# Patient Record
Sex: MALE | Race: BLACK OR AFRICAN AMERICAN | Employment: FULL TIME | URBAN - METROPOLITAN AREA
[De-identification: names, ages, dates, MRNs, and addresses within clinical notes are randomized per-mention and may not be internally consistent; named-entity substitution may affect disease eponyms.]

---

## 2022-04-12 DIAGNOSIS — M79.641 HAND PAIN, RIGHT: Primary | ICD-10-CM

## 2022-04-13 ENCOUNTER — OFFICE VISIT (OUTPATIENT)
Dept: ORTHOPEDIC SURGERY | Age: 57
End: 2022-04-13
Payer: MEDICARE

## 2022-04-13 VITALS — HEIGHT: 73 IN | WEIGHT: 175 LBS | BODY MASS INDEX: 23.19 KG/M2

## 2022-04-13 DIAGNOSIS — S62.336A CLOSED DISPLACED FRACTURE OF NECK OF FIFTH METACARPAL BONE OF RIGHT HAND, INITIAL ENCOUNTER: Primary | ICD-10-CM

## 2022-04-13 PROCEDURE — G8427 DOCREV CUR MEDS BY ELIG CLIN: HCPCS | Performed by: PHYSICIAN ASSISTANT

## 2022-04-13 PROCEDURE — G8420 CALC BMI NORM PARAMETERS: HCPCS | Performed by: PHYSICIAN ASSISTANT

## 2022-04-13 PROCEDURE — 4004F PT TOBACCO SCREEN RCVD TLK: CPT | Performed by: PHYSICIAN ASSISTANT

## 2022-04-13 PROCEDURE — 99204 OFFICE O/P NEW MOD 45 MIN: CPT | Performed by: PHYSICIAN ASSISTANT

## 2022-04-13 PROCEDURE — 29125 APPL SHORT ARM SPLINT STATIC: CPT | Performed by: PHYSICIAN ASSISTANT

## 2022-04-13 PROCEDURE — 3017F COLORECTAL CA SCREEN DOC REV: CPT | Performed by: PHYSICIAN ASSISTANT

## 2022-04-13 ASSESSMENT — ENCOUNTER SYMPTOMS
SHORTNESS OF BREATH: 0
COUGH: 0
COLOR CHANGE: 0
VOMITING: 0

## 2022-04-13 NOTE — PROGRESS NOTES
[address]  [address] [facility] [address]  Dept: [phone]    Ambulatory Orthopedic New Patient Visit      CHIEF COMPLAINT:    Chief Complaint   Patient presents with    New Patient     Hand got caught putting a surpintine belt on. around 4/6/2022       HISTORY OF PRESENT ILLNESS:      Date of Injury: 4/6/2022    The patient is a 61 y.o. male who is being seen  for consultation and evaluation of right hand pain that has been present for approximately 1 week. Patient is a current inmate at [id number], he notes that he may be released today. He notes that he has a court date today at 1 PM.     Patient notes that his injury was sustained approximately 1 week ago on 4/6/2022. He notes that he was working on a car and got his hand stuck in a serpentine belt. He notes that he has not had medical evaluation or treatment for his hand since the injury. He notes that he has been taking ibuprofen for his discomfort. He denies numbness and or tingling in the right hand. He denies prior history of injury, trauma, surgery to the right hand.       Past Medical History:    Past Medical History:   Diagnosis Date    H. pylori infection     KENJI (obstructive sleep apnea) 6/14/2012       Past Surgical History:    Past Surgical History:   Procedure Laterality Date    UPPER GASTROINTESTINAL ENDOSCOPY  08/23/2016       Current Medications:   Current Outpatient Medications   Medication Sig Dispense Refill    dicyclomine (BENTYL) 10 MG capsule Take 1 capsule by mouth every 6 hours as needed (cramps) 30 capsule 0    famotidine (PEPCID) 40 MG tablet Take 1 tablet by mouth daily 30 tablet 1    omeprazole (PRILOSEC) 20 MG capsule Take 1 capsule by mouth 2 times daily 60 capsule 1    ondansetron (ZOFRAN) 4 MG tablet Take 1 tablet by mouth every 8 hours as needed for Nausea or Vomiting 30 tablet 1     No current facility-administered medications for this visit. Allergies:    Patient has no known allergies. Social History:   Social History     Socioeconomic History    Marital status: Single     Spouse name: Not on file    Number of children: Not on file    Years of education: Not on file    Highest education level: Not on file   Occupational History    Not on file   Tobacco Use    Smoking status: Current Every Day Smoker     Packs/day: 0.50     Years: 20.00     Pack years: 10.00     Types: Cigarettes    Smokeless tobacco: Never Used   Substance and Sexual Activity    Alcohol use: No    Drug use: No     Types: Cocaine     Comment: no drug use x 20 years    Sexual activity: Yes   Other Topics Concern    Not on file   Social History Narrative    Not on file     Social Determinants of Health     Financial Resource Strain:     Difficulty of Paying Living Expenses: Not on file   Food Insecurity:     Worried About Running Out of Food in the Last Year: Not on file    Charanjit of Food in the Last Year: Not on file   Transportation Needs:     Lack of Transportation (Medical): Not on file    Lack of Transportation (Non-Medical):  Not on file   Physical Activity:     Days of Exercise per Week: Not on file    Minutes of Exercise per Session: Not on file   Stress:     Feeling of Stress : Not on file   Social Connections:     Frequency of Communication with Friends and Family: Not on file    Frequency of Social Gatherings with Friends and Family: Not on file    Attends Anglican Services: Not on file    Active Member of Clubs or Organizations: Not on file    Attends Club or Organization Meetings: Not on file    Marital Status: Not on file   Intimate Partner Violence:     Fear of Current or Ex-Partner: Not on file    Emotionally Abused: Not on file    Physically Abused: Not on file    Sexually Abused: Not on file   Housing Stability:     Unable to Pay for Housing in the Last Year: Not on file    Number of [facility] in the Last Year: Not on file    Unstable Housing in the Last Year: Not on file       Family History:  Family History   Problem Relation Age of Onset    Cancer Mother     High Blood Pressure Mother     Stroke Sister          REVIEW OF SYSTEMS:  Review of Systems   Constitutional: Negative for activity change and fever. HENT: Negative for sneezing. Respiratory: Negative for cough and shortness of breath. Cardiovascular: Negative for chest pain. Gastrointestinal: Negative for vomiting. Musculoskeletal: Positive for arthralgias (right hand). Negative for joint swelling and myalgias. Skin: Negative for color change. Neurological: Negative for weakness and numbness. Psychiatric/Behavioral: Negative for sleep disturbance. PHYSICAL EXAM:  Ht 6' 1\" (1.854 m)   Wt 175 lb (79.4 kg)   BMI 23.09 kg/m²  Body mass index is 23.09 kg/m². Physical Exam  Gen: alert and oriented  Psych:  Appropriate affect; Appropriate knowledge base; Appropriate mood; No hallucinations; Head: normocephalic, atraumatic   Chest: symmetric chest excursion  Pelvis: stable with ambulation  Ortho Exam  Extremity: Evaluation of the right hand reveals mild diffuse swelling noted on the dorsum of the right hand. There is no erythema, ecchymosis, skin lesions or signs of infection noted. Significant tenderness noted with palpation over the dorsum of the fourth and fifth metacarpal heads. No tenderness noted over the palmar surface of the fourth or fifth metacarpal head. Patient is able to make a loose composite fist.  Normal cascade appreciated. Patient has full range of motion of the right wrist and elbow without discomfort noted. Sensation is intact to light touch to the right upper extremity without focal deficits present. The skin is noted to be warm with brisk capillary refill distally on the right hand. Radial pulse 2+ on the right.     Radiology:     XR HAND RIGHT (MIN 3 VIEWS)    Result Date: 4/13/2022  History: Right hand pain. Findings: AP, lateral, oblique images of the right hand obtained today in office reveal a mildly displaced right fifth metacarpal neck fracture with mild volar angulation. Joint spaces are well-maintained. No further evidence of fracture, subluxation, dislocation, radiopaque foreign body or radiopaque tumor noted within the right hand. Impression: Right hand fifth metacarpal neck fracture as described above. Procedure:  After informed consent was obtained verbally, a well molded well-padded TKO fast form cast was applied with the Right wrist in neutral position in the fourth and fifth fingers in an intrinsic plus position. No impingement of the cast was noted proximally or distally after application. The fingers were noted to be pink, warm, with brisk capillary refill after application of the cast.  The patient tolerated the cast application well without complications. ASSESSMENT:     1. Closed displaced fracture of neck of fifth metacarpal bone of right hand, initial encounter         PLAN:        Today in office we discussed etiology and natural history of right hand 5th metacarpal neck fracture. I personally reviewed the patient's x-rays from today revealing mildly displaced a right 5th metacarpal head fracture. The fracture has adequate alignment and at this point does not need surgical intervention. The patient was placed in a fast form TKO cast and was instructed to keep it clean and dry. He is to continue taking tylenol and ibuprofen foe pain control. He will follow up in 2 weeks with x-rays in the fast form cast.     He was instructed to call our office with questions or concerns. He voiced his understanding. Return in about 2 weeks (around 4/27/2022) for re-evaluation. Total Time: 45 min      No orders of the defined types were placed in this encounter.       Orders Placed This Encounter   Procedures    ND APPLY FOREARM SPLINT,STATIC       This note is created with the assistance of a speech recognition program.  While intending to generate a document that actually reflects the content of the visit, the document can still have some errors including those of syntax and sound a like substitutions which may escape proof reading. In such instances, actual meaning can be extrapolated by contextual diversion.      Electronically signed by Luz Maria Lozano PA-C 4/13/2022 at 8:41 PM

## 2022-04-13 NOTE — LETTER
Amalia Oropeza, [address]  [address] [facility] [address]  Dept: [phone]  Dept Fax: [phone]  4/13/22    Patient: Porter Ascencio  YOB: 1965    Dear None Provider,    I had the pleasure of seeing one of your patients, Kate Banegas today in the office. Below are the relevant portions of my assessment and plan of care. IMPRESSION:  1. Closed displaced fracture of neck of fifth metacarpal bone of right hand, initial encounter      PLAN:    1. The patient was placed in a right hand fast form cast. The patient was instructed to to keep in clean and dry. It is not able to be removed. Cover it with plastic wrap or a bag for bathing. 2. Tylenol 1000mg every 6 hours or Ibuprofen 800mg every 8 hours for pain. 3. Patient is to follow up in 2 weeks with x-rays in splint. Thank you for allowing me to participate in the care of this patient. I will keep you updated on this patient's follow up and I look forward to serving you and your patients again in the future.           Amalia Skinner PA-C

## 2022-04-26 ENCOUNTER — TELEPHONE (OUTPATIENT)
Dept: ORTHOPEDIC SURGERY | Age: 57
End: 2022-04-26

## 2022-04-26 NOTE — TELEPHONE ENCOUNTER
[address] [place] [address] called to cancel patient's appointment because the patient has been released from the facility. I tried reaching out to he patient for a reminder but could not reach him. I left appointment in case patient comes in please advise.

## 2022-04-27 ENCOUNTER — TELEPHONE (OUTPATIENT)
Dept: ORTHOPEDIC SURGERY | Age: 57
End: 2022-04-27

## 2022-04-27 DIAGNOSIS — S62.336A CLOSED DISPLACED FRACTURE OF NECK OF FIFTH METACARPAL BONE OF RIGHT HAND, INITIAL ENCOUNTER: Primary | ICD-10-CM

## 2022-04-29 ENCOUNTER — OFFICE VISIT (OUTPATIENT)
Dept: ORTHOPEDIC SURGERY | Age: 57
End: 2022-04-29
Payer: MEDICARE

## 2022-04-29 VITALS — WEIGHT: 175 LBS | HEIGHT: 73 IN | BODY MASS INDEX: 23.19 KG/M2

## 2022-04-29 DIAGNOSIS — S62.336D CLOSED DISPLACED FRACTURE OF NECK OF FIFTH METACARPAL BONE OF RIGHT HAND WITH ROUTINE HEALING, SUBSEQUENT ENCOUNTER: Primary | ICD-10-CM

## 2022-04-29 PROCEDURE — 3017F COLORECTAL CA SCREEN DOC REV: CPT | Performed by: PHYSICIAN ASSISTANT

## 2022-04-29 PROCEDURE — 4004F PT TOBACCO SCREEN RCVD TLK: CPT | Performed by: PHYSICIAN ASSISTANT

## 2022-04-29 PROCEDURE — G8427 DOCREV CUR MEDS BY ELIG CLIN: HCPCS | Performed by: PHYSICIAN ASSISTANT

## 2022-04-29 PROCEDURE — G8420 CALC BMI NORM PARAMETERS: HCPCS | Performed by: PHYSICIAN ASSISTANT

## 2022-04-29 PROCEDURE — 99214 OFFICE O/P EST MOD 30 MIN: CPT | Performed by: PHYSICIAN ASSISTANT

## 2022-04-29 ASSESSMENT — ENCOUNTER SYMPTOMS
SHORTNESS OF BREATH: 0
VOMITING: 0
COUGH: 0
COLOR CHANGE: 0

## 2022-04-29 NOTE — PROGRESS NOTES
[address]  [address]  Dept: [phone]  Dept Fax: [phone]        Ambulatory Follow Up      Subjective: Loan Cortez is a 61y.o. year old male who presents to our office today for routine followup regarding his   1. Closed displaced fracture of neck of fifth metacarpal bone of right hand with routine healing, subsequent encounter        Chief Complaint   Patient presents with    Follow-up     5th metacarpal fx  DOI 04/6/2022       Date of Injury: 4/6/2022    HPI Loan Cortez  is a 61 y.o. Right hand dominant  male who presents today in follow for right fifth metacarpal neck fracture sustained on 4/6/2022 after getting his right hand stuck in a serpentine belt of a motor vehicle. The patient was last seen on 4/13/2022 and underwent treatment in the form of application of a fast form TKO splint. At the time the patient was incarcerated at [address] [place] [phone] center but has been released since then. The patient notes some continued discomfort within the right hand. He notes he has been able to maintain the fast form splint on the right upper extremity without difficulty. Review of Systems   Constitutional: Negative for activity change and fever. HENT: Negative for sneezing. Respiratory: Negative for cough and shortness of breath. Cardiovascular: Negative for chest pain. Gastrointestinal: Negative for vomiting. Musculoskeletal: Positive for arthralgias (right hand). Negative for joint swelling and myalgias. Skin: Negative for color change. Neurological: Negative for weakness and numbness. Psychiatric/Behavioral: Negative for sleep disturbance. Objective :   Ht 6' 1\" (1.854 m)   Wt 175 lb (79.4 kg)   BMI 23.09 kg/m²  Body mass index is 23.09 kg/m². General: Loan Cortez is a 61 y.o. male who is alert and oriented and sitting comfortably in our office.   Ortho Exam  MS: Patient arrived with the fast form splint on the right upper extremity. There is moderate signs of wear noted to the splint. Area of exposed skin on the proximal aspects of the fourth and fifth finger on the right hand and the remainder of the right hand shows no significant swelling. The splint continues to fit appropriately. Patient has full range of motion of the right elbow without discomfort noted. Sensation is intact to the exposed areas of skin without abscess present. The skin is noted to be warm with brisk capillary refill distally. Radial pulse 2+ on the right. Neuro: alert and oriented to person and place. Eyes: Extra-ocular muscles intact  Mouth: Oral mucosa moist. No perioral lesions  Pulm: Respirations unlabored and regular. Symmetric chest excursion without outward deformity is noted. Skin: warm, well perfused  Psych:   Patient has good fund of knowledge and displays understanging of exam, diagnosis, and plan. Radiology:   XR HAND RIGHT (MIN 3 VIEWS)    Result Date: 4/29/2022  History: Right hand pain. Comparison: 4/13/2022. Findings: AP, lateral, oblique images of the right hand obtained today in office reveal a mildly displaced right fifth metacarpal neck fracture with mild dorsal angulation appreciated. Joint spaces are well-maintained. Interval healing appreciated with mild bony callus formation noted on the volar surface of the fracture. There is no further evidence of fracture, subluxation, dislocation, radiopaque foreign body or radiopaque tumor noted within the right hand. No change in alignment when compared to images from 4/13/2022. Impression: Right hand fifth metacarpal neck fracture healing as described above. XR HAND RIGHT (MIN 3 VIEWS)    Result Date: 4/20/2022  History: Right hand pain.  Findings: AP, lateral, oblique images of the right hand obtained today in office reveal a mildly displaced right fifth metacarpal neck fracture with mild volar angulation. Joint spaces are well-maintained. No further evidence of fracture, subluxation, dislocation, radiopaque foreign body or radiopaque tumor noted within the right hand. Impression: Right hand fifth metacarpal neck fracture as described above. Assessment:      1. Closed displaced fracture of neck of fifth metacarpal bone of right hand with routine healing, subsequent encounter       Plan:      Patient is currently 3 weeks post injury after sustaining a right fifth metacarpal neck fracture on 4/6/2022. The patient has been in a right upper extremity fast form TKO brace since 4/13/2022. The patient has maintained the brace. I personally reviewed his right hand x-rays from today and compared them to prior images revealing a mildly displaced right fifth metacarpal neck fracture with notable volar angulation appreciated. The patient's initial fist cascade was normal therefore we are going to have him continue in the fast form splint on the right upper extremity. He was instructed to keep it clean and dry and not to remove it until his next appointment. The patient will follow up in 3 weeks with x-rays of the right hand out of fast form cast.  He was instructed to call us with any questions or concerns prior to his next appointment. He noted his understanding. Follow up:Return in about 3 weeks (around 5/20/2022) for re-evaluation with x-rays of right hand out of fast form cast.    Total Time: 35 min      No orders of the defined types were placed in this encounter. Orders Placed This Encounter   Procedures    XR HAND RIGHT (MIN 3 VIEWS)     Standing Status:   Future     Number of Occurrences:   1     Standing Expiration Date:   4/29/2023     Scheduling Instructions:       In brace       This note is created with the assistance of a speech recognition program.  While intending to generate a document that actually reflects the content of the visit, the document can still have some errors including those of syntax and sound a like substitutions which may escape proof reading. In such instances, actual meaning can be extrapolated by contextual diversion.      Electronically signed by Faustina Baugh PA-C on 4/29/2022 at 3:23 PM

## 2022-05-19 DIAGNOSIS — R52 PAIN: Primary | ICD-10-CM

## 2022-12-08 ENCOUNTER — HOSPITAL ENCOUNTER (EMERGENCY)
Age: 57
Discharge: HOME | End: 2022-12-08
Attending: EMERGENCY MEDICINE
Payer: MEDICARE

## 2022-12-08 VITALS
HEIGHT: 73 IN | OXYGEN SATURATION: 99 % | WEIGHT: 165 LBS | HEART RATE: 63 BPM | TEMPERATURE: 97.2 F | DIASTOLIC BLOOD PRESSURE: 76 MMHG | BODY MASS INDEX: 21.87 KG/M2 | RESPIRATION RATE: 18 BRPM | SYSTOLIC BLOOD PRESSURE: 123 MMHG

## 2022-12-08 DIAGNOSIS — S39.012A STRAIN OF LUMBAR REGION, INITIAL ENCOUNTER: Primary | ICD-10-CM

## 2022-12-08 DIAGNOSIS — B34.9 VIRAL ILLNESS: ICD-10-CM

## 2022-12-08 PROCEDURE — 99283 EMERGENCY DEPT VISIT LOW MDM: CPT

## 2022-12-08 PROCEDURE — 6370000000 HC RX 637 (ALT 250 FOR IP): Performed by: EMERGENCY MEDICINE

## 2022-12-08 RX ORDER — IBUPROFEN 400 MG/1
400 TABLET ORAL ONCE
Status: COMPLETED | OUTPATIENT
Start: 2022-12-08 | End: 2022-12-08

## 2022-12-08 RX ORDER — IBUPROFEN 400 MG/1
400 TABLET ORAL EVERY 8 HOURS PRN
Qty: 120 TABLET | Refills: 0 | Status: SHIPPED | OUTPATIENT
Start: 2022-12-08

## 2022-12-08 RX ORDER — CYCLOBENZAPRINE HCL 5 MG
5 TABLET ORAL 2 TIMES DAILY PRN
Qty: 6 TABLET | Refills: 0 | Status: SHIPPED | OUTPATIENT
Start: 2022-12-08 | End: 2022-12-11

## 2022-12-08 RX ORDER — GUAIFENESIN 600 MG/1
600 TABLET, EXTENDED RELEASE ORAL 2 TIMES DAILY PRN
Qty: 10 TABLET | Refills: 0 | Status: SHIPPED | OUTPATIENT
Start: 2022-12-08 | End: 2022-12-13

## 2022-12-08 RX ORDER — ACETAMINOPHEN 500 MG
1000 TABLET ORAL EVERY 8 HOURS PRN
Qty: 360 TABLET | Refills: 0 | Status: SHIPPED | OUTPATIENT
Start: 2022-12-08

## 2022-12-08 RX ORDER — CYCLOBENZAPRINE HCL 10 MG
10 TABLET ORAL ONCE
Status: COMPLETED | OUTPATIENT
Start: 2022-12-08 | End: 2022-12-08

## 2022-12-08 RX ADMIN — IBUPROFEN 400 MG: 400 TABLET, FILM COATED ORAL at 15:53

## 2022-12-08 RX ADMIN — CYCLOBENZAPRINE 10 MG: 10 TABLET, FILM COATED ORAL at 15:53

## 2022-12-08 ASSESSMENT — PAIN DESCRIPTION - LOCATION
LOCATION: BACK
LOCATION: BACK

## 2022-12-08 ASSESSMENT — PAIN DESCRIPTION - DESCRIPTORS: DESCRIPTORS: SHARP;SHOOTING

## 2022-12-08 ASSESSMENT — ENCOUNTER SYMPTOMS
BACK PAIN: 1
VOMITING: 0
DIARRHEA: 0
COUGH: 1
SHORTNESS OF BREATH: 0
SORE THROAT: 0
NAUSEA: 0
ABDOMINAL PAIN: 0

## 2022-12-08 ASSESSMENT — PAIN DESCRIPTION - FREQUENCY: FREQUENCY: CONTINUOUS

## 2022-12-08 ASSESSMENT — PAIN DESCRIPTION - ORIENTATION
ORIENTATION: LOWER
ORIENTATION: LOWER

## 2022-12-08 ASSESSMENT — PAIN SCALES - GENERAL
PAINLEVEL_OUTOF10: 10
PAINLEVEL_OUTOF10: 10

## 2022-12-08 ASSESSMENT — PAIN - FUNCTIONAL ASSESSMENT: PAIN_FUNCTIONAL_ASSESSMENT: 0-10

## 2022-12-08 NOTE — ED PROVIDER NOTES
[address] ED  Emergency Department Encounter  Emergency Medicine Resident     Pt Pily Townsend  MRN: [id number]  Amparogfenid 1965  Date of evaluation: 12/8/22  PCP:  [address]       Chief Complaint   Patient presents with    Back Pain    Nasal Congestion       HISTORY OF PRESENT ILLNESS  (Location/Symptom, Timing/Onset, Context/Setting, Quality, Duration, Modifying Factors, Severity.)      Sree Alcazar is a 61 y.o. male who presents with nasal congestion and cough and low back pain that started 3-4 days ago. He denies fever or chills, no n/v/d or abd pain. He denies bowel or bladder changes. States pain makes it difficult to walk however he did walk here today. PAST MEDICAL / SURGICAL / SOCIAL / FAMILY HISTORY      has a past medical history of H. pylori infection and KENJI (obstructive sleep apnea).       has a past surgical history that includes Upper gastrointestinal endoscopy (08/23/2016).       Social History     Socioeconomic History    Marital status: Single     Spouse name: Not on file    Number of children: Not on file    Years of education: Not on file    Highest education level: Not on file   Occupational History    Not on file   Tobacco Use    Smoking status: Every Day     Packs/day: 0.50     Years: 20.00     Pack years: 10.00     Types: Cigarettes    Smokeless tobacco: Never   Substance and Sexual Activity    Alcohol use: No    Drug use: No     Types: Cocaine     Comment: no drug use x 20 years    Sexual activity: Yes   Other Topics Concern    Not on file   Social History Narrative    Not on file     Social Determinants of Health     Financial Resource Strain: Not on file   Food Insecurity: Not on file   Transportation Needs: Not on file   Physical Activity: Not on file   Stress: Not on file   Social Connections: Not on file   Intimate Partner Violence: Not on file   Housing Stability: Not on file       Family History   Problem Relation Age of Onset Cancer Mother     High Blood Pressure Mother     Stroke Sister        Allergies:  Patient has no known allergies. Home Medications:  Prior to Admission medications    Medication Sig Start Date End Date Taking? Authorizing Provider   ibuprofen (IBU) 400 MG tablet Take 1 tablet by mouth every 8 hours as needed for Pain 12/8/22  Yes Henry Comings, DO   acetaminophen (TYLENOL) 500 MG tablet Take 2 tablets by mouth every 8 hours as needed for Pain 12/8/22  Yes Henry Comings, DO   guaiFENesin (MUCINEX) 600 MG extended release tablet Take 1 tablet by mouth 2 times daily as needed for Congestion 12/8/22 12/13/22 Yes Henry Comings, DO   cyclobenzaprine (FLEXERIL) 5 MG tablet Take 1 tablet by mouth 2 times daily as needed for Muscle spasms 12/8/22 12/11/22 Yes Henry Comings, DO   dicyclomine (BENTYL) 10 MG capsule Take 1 capsule by mouth every 6 hours as needed (cramps) 8/23/16   Vini Hymen, DO   famotidine (PEPCID) 40 MG tablet Take 1 tablet by mouth daily 8/23/16   Vini Hymen, DO   omeprazole (PRILOSEC) 20 MG capsule Take 1 capsule by mouth 2 times daily 8/23/16 10/22/16  Vini Hymen, DO   ondansetron (ZOFRAN) 4 MG tablet Take 1 tablet by mouth every 8 hours as needed for Nausea or Vomiting 7/30/16   Sofia Hermosilloing, DO       REVIEW OF SYSTEMS    (2-9 systems for level 4, 10 or more for level 5)      Review of Systems   Constitutional:  Negative for chills and fever. HENT:  Positive for congestion. Negative for sore throat. Respiratory:  Positive for cough. Negative for shortness of breath. Cardiovascular:  Negative for chest pain. Gastrointestinal:  Negative for abdominal pain, diarrhea, nausea and vomiting. Musculoskeletal:  Positive for back pain. Negative for neck pain. Allergic/Immunologic: Negative for immunocompromised state. Neurological:  Negative for weakness and numbness. Hematological:  Does not bruise/bleed easily.      PHYSICAL EXAM   (up to 7 for level 4, 8 or more for level 5) INITIAL VITALS:   /76   Pulse 63   Temp 97.2 °F (36.2 °C) (Oral)   Resp 18   Ht 6' 1\" (1.854 m)   Wt 165 lb (74.8 kg)   SpO2 99%   BMI 21.77 kg/m²     Physical Exam  Constitutional:       General: He is not in acute distress. HENT:      Head: Normocephalic and atraumatic. Cardiovascular:      Rate and Rhythm: Normal rate and regular rhythm. Heart sounds: Normal heart sounds. Comments: DP pulses 2+  Pulmonary:      Effort: Pulmonary effort is normal.      Breath sounds: Normal breath sounds. Abdominal:      General: There is no distension. Palpations: Abdomen is soft. Tenderness: There is no abdominal tenderness. There is no right CVA tenderness, left CVA tenderness or guarding. Musculoskeletal:         General: No tenderness. Cervical back: No bony tenderness. Lumbar back: No bony tenderness. Decreased range of motion. Negative right straight leg raise test and negative left straight leg raise test.        Back:       Right lower leg: No edema. Left lower leg: No edema. Neurological:      Mental Status: He is alert. DIFFERENTIAL  DIAGNOSIS     PLAN (LABS / IMAGING / EKG):  No orders of the defined types were placed in this encounter.       MEDICATIONS ORDERED:  Orders Placed This Encounter   Medications    ibuprofen (ADVIL;MOTRIN) tablet 400 mg    cyclobenzaprine (FLEXERIL) tablet 10 mg    ibuprofen (IBU) 400 MG tablet     Sig: Take 1 tablet by mouth every 8 hours as needed for Pain     Dispense:  120 tablet     Refill:  0    acetaminophen (TYLENOL) 500 MG tablet     Sig: Take 2 tablets by mouth every 8 hours as needed for Pain     Dispense:  360 tablet     Refill:  0    guaiFENesin (MUCINEX) 600 MG extended release tablet     Sig: Take 1 tablet by mouth 2 times daily as needed for Congestion     Dispense:  10 tablet     Refill:  0    cyclobenzaprine (FLEXERIL) 5 MG tablet     Sig: Take 1 tablet by mouth 2 times daily as needed for Muscle spasms Dispense:  6 tablet     Refill:  0       MDM: Patient with low back pain that started 3 days ago. He states he thought it was due to \"tweaking his back in his sleep\" but symptoms have persisted. He has trouble ambulating due to pain. Denies weakness in extremities, no numbness/tingling, pain does not radiate. No urinary symptoms. Denies changes in bowel or bladder symptoms. No fever or chills, no n/v/d. No headache. He does complain of cough and nasal congestion for the last few days as well. Likely muscle strain/spasm. He states he took tylenol a few days ago and a motrin last night, nothing today. Less likely sacroiliitis given age and no GI, skin, eye features. DIAGNOSTIC RESULTS / EMERGENCY DEPARTMENT COURSE / MDM   LAB RESULTS:  No results found for this visit on 12/08/22. IMPRESSION: Pain improved with muscle relaxer and motrin. Patient ambulating around room without difficulty. Will discharge with tylenol, motrin, mucinex, and flexeril. Patient has viral illness and muscle spasm/strain     RADIOLOGY:  No orders to display       EMERGENCY DEPARTMENT COURSE:      ED Course as of 12/08/22 1637   Thu Dec 08, 2022   1632 Reevaluated and patient states pain is improved. He was amble to get out of bed alone and ambulate around the room [SK]      ED Course User Index  [SK] Yaquelin Nino DO       No notes of EC Admission Criteria type on file. PROCEDURES:      CONSULTS:  None    CRITICAL CARE:      FINAL IMPRESSION      1. Strain of lumbar region, initial encounter    2. Viral illness          DISPOSITION / PLAN     DISPOSITION Decision To Discharge 12/08/2022 04:34:11 PM      PATIENT REFERRED TO:  No follow-up provider specified.     DISCHARGE MEDICATIONS:  New Prescriptions    ACETAMINOPHEN (TYLENOL) 500 MG TABLET    Take 2 tablets by mouth every 8 hours as needed for Pain    CYCLOBENZAPRINE (FLEXERIL) 5 MG TABLET    Take 1 tablet by mouth 2 times daily as needed for Muscle spasms    GUAIFENESIN (MUCINEX) 600 MG EXTENDED RELEASE TABLET    Take 1 tablet by mouth 2 times daily as needed for Congestion    IBUPROFEN (IBU) 400 MG TABLET    Take 1 tablet by mouth every 8 hours as needed for Pain       Padmini Delong DO  Emergency Medicine Resident    (Please note that portions of thisnote were completed with a voice recognition program.  Efforts were made to edit the dictations but occasionally words are mis-transcribed.)        Padmini Delong DO  Resident  12/08/22 1052

## 2022-12-08 NOTE — DISCHARGE INSTRUCTIONS
Viral Rivera!!!    From [facility] Emergency Department    On behalf of the Emergency Department staff at [facility]. [facility] Emergency Department, I would like to thank you for giving Alicia [facility]ccormick the opportunity to address your health care needs and concerns. You were seen today for nasal congestion and back pain. You have a cough and congestion that is consistent with a viral illness. I will prescribe you mucinex for the congestion. You can also use warm showers/baths to help relieve congestion. Your back pain is consistent with muscle strain and spasm. I recommend you use tylenol and motrin for pain. You can also use a heating pad for comfort. If you notice any concerning symptoms please return to the ER immediately. These can include but are not limited to: shortness of breath, weakness or numbness in extremities, changes in urination or bowel movements, fever, or abdominal pain. Medications: Continue taking your home medications as previously directed. For pain You may take tylenol 1,000mg by mouth every 8 hours as needed for pain. Do not exceed 4,000mg per day. If you have liver disease don't take tylenol. You may also take ibuprofen 800mg every 8 hours as needed for pain. Do not exceed 2,400 mg per day. If you experience stomach pain or you have a history of kidney disease stop taking ibuprofen. You may alternate application of ice and heat 20 minutes at a time as desired. You can use flexeril for pain as well, they may make you sleepy so do not drive or operate heavy machinery. Follow up: Please follow up with primary care doctor within one week or as needed.

## 2022-12-08 NOTE — ED TRIAGE NOTES
Patient presented to the ED today with complaints of back pain and nasal congestion. Patient states that he injured his back but is unsure how the injury occurred. Patient states he has congestion and coughing up green color sputum. Patient states symptoms presented 3 days ago.

## 2023-01-20 ENCOUNTER — APPOINTMENT (OUTPATIENT)
Dept: CT IMAGING | Age: 58
DRG: 134 | End: 2023-01-20
Payer: MEDICARE

## 2023-01-20 ENCOUNTER — ANCILLARY PROCEDURE (OUTPATIENT)
Dept: EMERGENCY DEPT | Age: 58
DRG: 134 | End: 2023-01-20
Payer: MEDICARE

## 2023-01-20 ENCOUNTER — APPOINTMENT (OUTPATIENT)
Dept: GENERAL RADIOLOGY | Age: 58
DRG: 134 | End: 2023-01-20
Payer: MEDICARE

## 2023-01-20 ENCOUNTER — HOSPITAL ENCOUNTER (INPATIENT)
Age: 58
LOS: 1 days | Discharge: HOME | DRG: 134 | End: 2023-01-21
Attending: EMERGENCY MEDICINE | Admitting: INTERNAL MEDICINE
Payer: MEDICARE

## 2023-01-20 DIAGNOSIS — J36 PERITONSILLAR ABSCESS: ICD-10-CM

## 2023-01-20 DIAGNOSIS — I26.93 SINGLE SUBSEGMENTAL PULMONARY EMBOLISM WITHOUT ACUTE COR PULMONALE (HCC): Primary | ICD-10-CM

## 2023-01-20 DIAGNOSIS — J02.0 STREP PHARYNGITIS: ICD-10-CM

## 2023-01-20 PROBLEM — I26.99 ACUTE PULMONARY EMBOLISM WITHOUT ACUTE COR PULMONALE (HCC): Status: ACTIVE | Noted: 2023-01-20

## 2023-01-20 PROBLEM — N18.9 CKD (CHRONIC KIDNEY DISEASE): Status: ACTIVE | Noted: 2023-01-20

## 2023-01-20 PROBLEM — D72.829 LEUKOCYTOSIS: Status: ACTIVE | Noted: 2023-01-20

## 2023-01-20 LAB
ABSOLUTE EOS #: 0.28 K/UL (ref 0–0.44)
ABSOLUTE IMMATURE GRANULOCYTE: 0.06 K/UL (ref 0–0.3)
ABSOLUTE LYMPH #: 1.81 K/UL (ref 1.1–3.7)
ABSOLUTE MONO #: 1.11 K/UL (ref 0.1–1.2)
ANION GAP SERPL CALCULATED.3IONS-SCNC: 11 MMOL/L (ref 9–17)
BASOPHILS # BLD: 1 % (ref 0–2)
BASOPHILS ABSOLUTE: 0.06 K/UL (ref 0–0.2)
BUN BLDV-MCNC: 16 MG/DL (ref 6–20)
CALCIUM SERPL-MCNC: 9.3 MG/DL (ref 8.6–10.4)
CHLORIDE BLD-SCNC: 98 MMOL/L (ref 98–107)
CO2: 23 MMOL/L (ref 20–31)
CREAT SERPL-MCNC: 1.28 MG/DL (ref 0.7–1.2)
D-DIMER QUANTITATIVE: 0.8 MG/L FEU
EOSINOPHILS RELATIVE PERCENT: 2 % (ref 1–4)
GFR SERPL CREATININE-BSD FRML MDRD: >60 ML/MIN/1.73M2
GLUCOSE BLD-MCNC: 110 MG/DL (ref 70–99)
HCT VFR BLD CALC: 38.9 % (ref 40.7–50.3)
HCT VFR BLD CALC: 42.4 % (ref 40.7–50.3)
HEMOGLOBIN: 13.2 G/DL (ref 13–17)
HEMOGLOBIN: 13.3 G/DL (ref 13–17)
IMMATURE GRANULOCYTES: 1 %
LYMPHOCYTES # BLD: 15 % (ref 24–43)
MCH RBC QN AUTO: 31.3 PG (ref 25.2–33.5)
MCH RBC QN AUTO: 34.5 PG (ref 25.2–33.5)
MCHC RBC AUTO-ENTMCNC: 31.4 G/DL (ref 28.4–34.8)
MCHC RBC AUTO-ENTMCNC: 33.9 G/DL (ref 28.4–34.8)
MCV RBC AUTO: 101.6 FL (ref 82.6–102.9)
MCV RBC AUTO: 99.8 FL (ref 82.6–102.9)
MONOCYTES # BLD: 9 % (ref 3–12)
NRBC AUTOMATED: 0 PER 100 WBC
NRBC AUTOMATED: 0.2 PER 100 WBC
PARTIAL THROMBOPLASTIN TIME: 29.4 SEC (ref 20.5–30.5)
PARTIAL THROMBOPLASTIN TIME: 48.2 SEC (ref 20.5–30.5)
PARTIAL THROMBOPLASTIN TIME: 63.9 SEC (ref 20.5–30.5)
PARTIAL THROMBOPLASTIN TIME: 96.8 SEC (ref 20.5–30.5)
PDW BLD-RTO: 13.5 % (ref 11.8–14.4)
PDW BLD-RTO: 14 % (ref 11.8–14.4)
PLATELET # BLD: 262 K/UL (ref 138–453)
PLATELET # BLD: 557 K/UL (ref 138–453)
PMV BLD AUTO: 12.4 FL (ref 8.1–13.5)
PMV BLD AUTO: 9.9 FL (ref 8.1–13.5)
POTASSIUM SERPL-SCNC: 3.9 MMOL/L (ref 3.7–5.3)
RBC # BLD: 3.83 M/UL (ref 4.21–5.77)
RBC # BLD: 4.25 M/UL (ref 4.21–5.77)
S PYO AG THROAT QL: POSITIVE
SEG NEUTROPHILS: 72 % (ref 36–65)
SEGMENTED NEUTROPHILS ABSOLUTE COUNT: 9.18 K/UL (ref 1.5–8.1)
SODIUM BLD-SCNC: 132 MMOL/L (ref 135–144)
SOURCE: ABNORMAL
TROPONIN, HIGH SENSITIVITY: 10 NG/L (ref 0–22)
TROPONIN, HIGH SENSITIVITY: 11 NG/L (ref 0–22)
WBC # BLD: 12.5 K/UL (ref 3.5–11.3)
WBC # BLD: 13.8 K/UL (ref 3.5–11.3)

## 2023-01-20 PROCEDURE — 6360000002 HC RX W HCPCS: Performed by: EMERGENCY MEDICINE

## 2023-01-20 PROCEDURE — 85025 COMPLETE CBC W/AUTO DIFF WBC: CPT

## 2023-01-20 PROCEDURE — 93308 TTE F-UP OR LMTD: CPT

## 2023-01-20 PROCEDURE — 99285 EMERGENCY DEPT VISIT HI MDM: CPT

## 2023-01-20 PROCEDURE — 93970 EXTREMITY STUDY: CPT

## 2023-01-20 PROCEDURE — 6360000004 HC RX CONTRAST MEDICATION: Performed by: EMERGENCY MEDICINE

## 2023-01-20 PROCEDURE — 84484 ASSAY OF TROPONIN QUANT: CPT

## 2023-01-20 PROCEDURE — 85730 THROMBOPLASTIN TIME PARTIAL: CPT

## 2023-01-20 PROCEDURE — 85379 FIBRIN DEGRADATION QUANT: CPT

## 2023-01-20 PROCEDURE — 6370000000 HC RX 637 (ALT 250 FOR IP): Performed by: NURSE PRACTITIONER

## 2023-01-20 PROCEDURE — 93005 ELECTROCARDIOGRAM TRACING: CPT | Performed by: EMERGENCY MEDICINE

## 2023-01-20 PROCEDURE — 85027 COMPLETE CBC AUTOMATED: CPT

## 2023-01-20 PROCEDURE — 70491 CT SOFT TISSUE NECK W/DYE: CPT

## 2023-01-20 PROCEDURE — 2580000003 HC RX 258: Performed by: NURSE PRACTITIONER

## 2023-01-20 PROCEDURE — 71046 X-RAY EXAM CHEST 2 VIEWS: CPT

## 2023-01-20 PROCEDURE — 71260 CT THORAX DX C+: CPT | Performed by: EMERGENCY MEDICINE

## 2023-01-20 PROCEDURE — 2580000003 HC RX 258: Performed by: EMERGENCY MEDICINE

## 2023-01-20 PROCEDURE — 6360000002 HC RX W HCPCS: Performed by: NURSE PRACTITIONER

## 2023-01-20 PROCEDURE — 96375 TX/PRO/DX INJ NEW DRUG ADDON: CPT

## 2023-01-20 PROCEDURE — 87880 STREP A ASSAY W/OPTIC: CPT

## 2023-01-20 PROCEDURE — 99223 1ST HOSP IP/OBS HIGH 75: CPT | Performed by: FAMILY MEDICINE

## 2023-01-20 PROCEDURE — 96374 THER/PROPH/DIAG INJ IV PUSH: CPT

## 2023-01-20 PROCEDURE — 2060000000 HC ICU INTERMEDIATE R&B

## 2023-01-20 PROCEDURE — 36415 COLL VENOUS BLD VENIPUNCTURE: CPT

## 2023-01-20 PROCEDURE — 80048 BASIC METABOLIC PNL TOTAL CA: CPT

## 2023-01-20 RX ORDER — IBUPROFEN 400 MG/1
400 TABLET ORAL EVERY 8 HOURS PRN
Status: DISCONTINUED | OUTPATIENT
Start: 2023-01-20 | End: 2023-01-21

## 2023-01-20 RX ORDER — POTASSIUM CHLORIDE 7.45 MG/ML
10 INJECTION INTRAVENOUS PRN
Status: DISCONTINUED | OUTPATIENT
Start: 2023-01-20 | End: 2023-01-21

## 2023-01-20 RX ORDER — HEPARIN SODIUM 1000 [USP'U]/ML
80 INJECTION, SOLUTION INTRAVENOUS; SUBCUTANEOUS PRN
Status: DISCONTINUED | OUTPATIENT
Start: 2023-01-20 | End: 2023-01-21

## 2023-01-20 RX ORDER — POLYETHYLENE GLYCOL 3350 17 G/17G
17 POWDER, FOR SOLUTION ORAL DAILY PRN
Status: DISCONTINUED | OUTPATIENT
Start: 2023-01-20 | End: 2023-01-21 | Stop reason: HOSPADM

## 2023-01-20 RX ORDER — MAGNESIUM SULFATE 1 G/100ML
1000 INJECTION INTRAVENOUS PRN
Status: DISCONTINUED | OUTPATIENT
Start: 2023-01-20 | End: 2023-01-21 | Stop reason: HOSPADM

## 2023-01-20 RX ORDER — ACETAMINOPHEN 650 MG/1
650 SUPPOSITORY RECTAL EVERY 6 HOURS PRN
Status: DISCONTINUED | OUTPATIENT
Start: 2023-01-20 | End: 2023-01-21 | Stop reason: HOSPADM

## 2023-01-20 RX ORDER — HEPARIN SODIUM 1000 [USP'U]/ML
80 INJECTION, SOLUTION INTRAVENOUS; SUBCUTANEOUS ONCE
Status: DISCONTINUED | OUTPATIENT
Start: 2023-01-20 | End: 2023-01-20 | Stop reason: SDUPTHER

## 2023-01-20 RX ORDER — SODIUM CHLORIDE 0.9 % (FLUSH) 0.9 %
5-40 SYRINGE (ML) INJECTION EVERY 12 HOURS SCHEDULED
Status: DISCONTINUED | OUTPATIENT
Start: 2023-01-20 | End: 2023-01-21 | Stop reason: HOSPADM

## 2023-01-20 RX ORDER — HEPARIN SODIUM AND DEXTROSE 10000; 5 [USP'U]/100ML; G/100ML
5-30 INJECTION INTRAVENOUS CONTINUOUS
Status: DISCONTINUED | OUTPATIENT
Start: 2023-01-20 | End: 2023-01-20 | Stop reason: SDUPTHER

## 2023-01-20 RX ORDER — DEXAMETHASONE SODIUM PHOSPHATE 10 MG/ML
10 INJECTION, SOLUTION INTRAMUSCULAR; INTRAVENOUS ONCE
Status: COMPLETED | OUTPATIENT
Start: 2023-01-20 | End: 2023-01-20

## 2023-01-20 RX ORDER — DEXAMETHASONE SODIUM PHOSPHATE 4 MG/ML
4 INJECTION, SOLUTION INTRA-ARTICULAR; INTRALESIONAL; INTRAMUSCULAR; INTRAVENOUS; SOFT TISSUE EVERY 12 HOURS
Status: DISCONTINUED | OUTPATIENT
Start: 2023-01-20 | End: 2023-01-20

## 2023-01-20 RX ORDER — HEPARIN SODIUM 1000 [USP'U]/ML
80 INJECTION, SOLUTION INTRAVENOUS; SUBCUTANEOUS PRN
Status: DISCONTINUED | OUTPATIENT
Start: 2023-01-20 | End: 2023-01-20 | Stop reason: SDUPTHER

## 2023-01-20 RX ORDER — FAMOTIDINE 20 MG/1
40 TABLET, FILM COATED ORAL DAILY
Status: DISCONTINUED | OUTPATIENT
Start: 2023-01-20 | End: 2023-01-21 | Stop reason: HOSPADM

## 2023-01-20 RX ORDER — POTASSIUM CHLORIDE 20 MEQ/1
40 TABLET, EXTENDED RELEASE ORAL PRN
Status: DISCONTINUED | OUTPATIENT
Start: 2023-01-20 | End: 2023-01-21

## 2023-01-20 RX ORDER — ENOXAPARIN SODIUM 100 MG/ML
40 INJECTION SUBCUTANEOUS DAILY
Status: DISCONTINUED | OUTPATIENT
Start: 2023-01-20 | End: 2023-01-20

## 2023-01-20 RX ORDER — HEPARIN SODIUM AND DEXTROSE 10000; 5 [USP'U]/100ML; G/100ML
5-30 INJECTION INTRAVENOUS CONTINUOUS
Status: DISCONTINUED | OUTPATIENT
Start: 2023-01-20 | End: 2023-01-21

## 2023-01-20 RX ORDER — ACETAMINOPHEN 325 MG/1
650 TABLET ORAL EVERY 6 HOURS PRN
Status: DISCONTINUED | OUTPATIENT
Start: 2023-01-20 | End: 2023-01-21 | Stop reason: HOSPADM

## 2023-01-20 RX ORDER — HEPARIN SODIUM 1000 [USP'U]/ML
40 INJECTION, SOLUTION INTRAVENOUS; SUBCUTANEOUS PRN
Status: DISCONTINUED | OUTPATIENT
Start: 2023-01-20 | End: 2023-01-21

## 2023-01-20 RX ORDER — HEPARIN SODIUM 1000 [USP'U]/ML
40 INJECTION, SOLUTION INTRAVENOUS; SUBCUTANEOUS PRN
Status: DISCONTINUED | OUTPATIENT
Start: 2023-01-20 | End: 2023-01-20 | Stop reason: SDUPTHER

## 2023-01-20 RX ORDER — ONDANSETRON 4 MG/1
4 TABLET, ORALLY DISINTEGRATING ORAL EVERY 8 HOURS PRN
Status: DISCONTINUED | OUTPATIENT
Start: 2023-01-20 | End: 2023-01-21 | Stop reason: HOSPADM

## 2023-01-20 RX ORDER — ONDANSETRON 2 MG/ML
4 INJECTION INTRAMUSCULAR; INTRAVENOUS EVERY 6 HOURS PRN
Status: DISCONTINUED | OUTPATIENT
Start: 2023-01-20 | End: 2023-01-21 | Stop reason: HOSPADM

## 2023-01-20 RX ORDER — SODIUM CHLORIDE 9 MG/ML
INJECTION, SOLUTION INTRAVENOUS PRN
Status: DISCONTINUED | OUTPATIENT
Start: 2023-01-20 | End: 2023-01-21 | Stop reason: HOSPADM

## 2023-01-20 RX ORDER — SODIUM CHLORIDE 0.9 % (FLUSH) 0.9 %
10 SYRINGE (ML) INJECTION PRN
Status: DISCONTINUED | OUTPATIENT
Start: 2023-01-20 | End: 2023-01-21 | Stop reason: HOSPADM

## 2023-01-20 RX ORDER — HEPARIN SODIUM 1000 [USP'U]/ML
80 INJECTION, SOLUTION INTRAVENOUS; SUBCUTANEOUS ONCE
Status: COMPLETED | OUTPATIENT
Start: 2023-01-20 | End: 2023-01-20

## 2023-01-20 RX ADMIN — HEPARIN SODIUM 18 UNITS/KG/HR: 10000 INJECTION, SOLUTION INTRAVENOUS at 05:06

## 2023-01-20 RX ADMIN — IOPAMIDOL 75 ML: 755 INJECTION, SOLUTION INTRAVENOUS at 03:37

## 2023-01-20 RX ADMIN — HEPARIN SODIUM 5980 UNITS: 1000 INJECTION, SOLUTION INTRAVENOUS; SUBCUTANEOUS at 05:05

## 2023-01-20 RX ADMIN — HEPARIN SODIUM 2990 UNITS: 1000 INJECTION, SOLUTION INTRAVENOUS; SUBCUTANEOUS at 15:40

## 2023-01-20 RX ADMIN — FAMOTIDINE 40 MG: 20 TABLET, FILM COATED ORAL at 08:03

## 2023-01-20 RX ADMIN — SODIUM CHLORIDE 3000 MG: 900 INJECTION INTRAVENOUS at 05:16

## 2023-01-20 RX ADMIN — SODIUM CHLORIDE 3000 MG: 900 INJECTION INTRAVENOUS at 11:36

## 2023-01-20 RX ADMIN — HEPARIN SODIUM 18 UNITS/KG/HR: 10000 INJECTION, SOLUTION INTRAVENOUS at 15:37

## 2023-01-20 RX ADMIN — SODIUM CHLORIDE 3000 MG: 900 INJECTION INTRAVENOUS at 23:26

## 2023-01-20 RX ADMIN — SODIUM CHLORIDE 3000 MG: 900 INJECTION INTRAVENOUS at 18:15

## 2023-01-20 RX ADMIN — SODIUM CHLORIDE, PRESERVATIVE FREE 10 ML: 5 INJECTION INTRAVENOUS at 21:12

## 2023-01-20 RX ADMIN — DEXAMETHASONE SODIUM PHOSPHATE 10 MG: 10 INJECTION INTRAMUSCULAR; INTRAVENOUS at 02:59

## 2023-01-20 ASSESSMENT — ENCOUNTER SYMPTOMS
ABDOMINAL PAIN: 0
DIARRHEA: 0
SHORTNESS OF BREATH: 1
NAUSEA: 0
STRIDOR: 0
RHINORRHEA: 0
VOMITING: 0
TROUBLE SWALLOWING: 1
SORE THROAT: 1
COUGH: 0
BLOOD IN STOOL: 0
WHEEZING: 0
CONSTIPATION: 0
COUGH: 1

## 2023-01-20 ASSESSMENT — LIFESTYLE VARIABLES
HOW MANY STANDARD DRINKS CONTAINING ALCOHOL DO YOU HAVE ON A TYPICAL DAY: PATIENT DOES NOT DRINK
HOW OFTEN DO YOU HAVE A DRINK CONTAINING ALCOHOL: NEVER

## 2023-01-20 NOTE — ED TRIAGE NOTES
Pt presents to ED with throat pain, pt states he has had this for months. States he is coughing up blood, having sob, difficulty swallowing. Pt is alert, oriented, and ambulatory.

## 2023-01-20 NOTE — CARE COORDINATION
Case Management Assessment  Initial Evaluation    Date/Time of Evaluation: 1/20/2023 8:55AM  Assessment Completed by: Gregory Aquino RN    If patient is discharged prior to next notation, then this note serves as note for discharge by case management. Patient Name: Lorrie Rowell                   YOB: 1965  Diagnosis: Peritonsillar abscess Demian Khalil                   Date / Time: 1/20/2023  2:14 AM    Patient Admission Status: Inpatient   Readmission Risk (Low < 19, Mod (19-27), High > 27): Readmission Risk Score: 6.9    Current PCP: No primary care provider on file. PCP verified by CM?  (doesn't have one)    Chart Reviewed: Yes      History Provided by: Patient  Patient Orientation: Alert and Oriented, Person, Place, Situation    Patient Cognition: Alert    Hospitalization in the last 30 days (Readmission):  No    If yes, Readmission Assessment in CM Navigator will be completed. Advance Directives:      Code Status: Full Code   Patient's Primary Decision Maker is: Legal Next of Kin      Discharge Planning:    Patient lives with: Family Members Type of Home: House  Primary Care Giver: Self  Patient Support Systems include: Family Members, Children, Parent   Current Financial resources: Medicaid  Current community resources: None  Current services prior to admission: None            Current DME:              Type of Home Care services:  None    ADLS  Prior functional level: Independent in ADLs/IADLs  Current functional level: Independent in ADLs/IADLs    PT AM-PAC:   /24  OT AM-PAC:   /24    Family can provide assistance at DC: Would you like Case Management to discuss the discharge plan with any other family members/significant others, and if so, who?     Plans to Return to Present Housing: Yes  Other Identified Issues/Barriers to RETURNING to current housing: none  Potential Assistance needed at discharge: N/A            Potential DME:    Patient expects to discharge to: [address] [place] [address] transportation at discharge:  family    Financial    Payor: Rajwinder Torres / Plan: Rajwinder 53 / Product Type: *No Product type* /     Does insurance require precert for SNF: Yes, but plan is home    Potential assistance Purchasing Medications: No  Meds-to-Beds request: Yes      [facility] [id number] Cristal Liang, [address]  [address] [facility] [address]  Phone: [phone] Fax: [phone]    [facility]/pharmacy [place] [address] [address]elia [phone] - F [phone]  [place] [address]  Phone: [phone] Fax: [phone]      Notes:    Factors facilitating achievement of predicted outcomes: Family support, Motivated, Cooperative, and Pleasant    Barriers to discharge: none    Additional Case Management Notes: Transitional planning-talked with patient. Plan is to go home with his sister. Has ride. Verified address, emergency contacts, and insurance with patient. The Plan for Transition of Care is related to the following treatment goals of Peritonsillar abscess [S97]    IF APPLICABLE: The Patient and/or patient representative Clemente Meeks and his family were provided with a choice of provider and agrees with the discharge plan. Freedom of choice list with basic dialogue that supports the patient's individualized plan of care/goals and shares the quality data associated with the providers was provided to: (P) Patient   Patient Representative Name:       The Patient and/or Patient Representative Agree with the Discharge Plan?  (P) Yes    Pat Elizondo RN  Case Management Department  Ph: [phone] Fax: [phone]

## 2023-01-20 NOTE — PROGRESS NOTES
BLE Dopplers ordered  Eris okay with pt eating  Diet order placed  Hep gtt running  WBC 13.8  IV Unasyn Q6h  IV Decadron Q12H

## 2023-01-20 NOTE — ED PROVIDER NOTES
[facility]  Emergency Department Encounter  Emergency Medicine Resident     Pt Sera Shepherd  MRN: [id number]  [place] 1965  Date of evaluation: 1/20/23  PCP:  No primary care provider on file. Note Started: 2:39 AM EST      CHIEF COMPLAINT       Chief Complaint   Patient presents with    Pharyngitis       HISTORY OF PRESENT ILLNESS  (Location/Symptom, Timing/Onset, Context/Setting, Quality, Duration, Modifying Factors, Severity.)      Cee Qureshi is a 61 y.o. male who presents with throat pain. She has had multiple weeks of this. Over the last 3 to 4 days he has had worsening shortness of breath, difficulty swallowing, and hemoptysis. He states has had tactile fevers at home. He states he had worsening swelling sensation in his throat. He also endorses having productive cough with green/gray sputum. He denies any sick contacts but does endorse a half is having a significant sore throat. He said he is unable to eat/drink secondary to pain. He has still had an appetite. PAST MEDICAL / SURGICAL / SOCIAL / FAMILY HISTORY      has a past medical history of H. pylori infection and KENJI (obstructive sleep apnea).       has a past surgical history that includes Upper gastrointestinal endoscopy (08/23/2016).       Social History     Socioeconomic History    Marital status: Single     Spouse name: Not on file    Number of children: Not on file    Years of education: Not on file    Highest education level: Not on file   Occupational History    Not on file   Tobacco Use    Smoking status: Every Day     Packs/day: 0.50     Years: 20.00     Pack years: 10.00     Types: Cigarettes    Smokeless tobacco: Never   Substance and Sexual Activity    Alcohol use: No    Drug use: No     Types: Cocaine     Comment: no drug use x 20 years    Sexual activity: Yes   Other Topics Concern    Not on file   Social History Narrative    Not on file     Social Determinants of Health     Financial Resource Strain: Not on file   Food Insecurity: Not on file   Transportation Needs: Not on file   Physical Activity: Not on file   Stress: Not on file   Social Connections: Not on file   Intimate Partner Violence: Not on file   Housing Stability: Not on file       Family History   Problem Relation Age of Onset    Cancer Mother     High Blood Pressure Mother     Stroke Sister        Allergies:  Patient has no known allergies. Home Medications:  Prior to Admission medications    Medication Sig Start Date End Date Taking? Authorizing Provider   ibuprofen (IBU) 400 MG tablet Take 1 tablet by mouth every 8 hours as needed for Pain  Patient not taking: Reported on 1/20/2023 12/8/22   Laura Smallwoodus, DO   acetaminophen (TYLENOL) 500 MG tablet Take 2 tablets by mouth every 8 hours as needed for Pain  Patient not taking: Reported on 1/20/2023 12/8/22   Laura Silvius, DO   dicyclomine (BENTYL) 10 MG capsule Take 1 capsule by mouth every 6 hours as needed (cramps)  Patient not taking: Reported on 1/20/2023 8/23/16   Gerhardt Reap, DO   famotidine (PEPCID) 40 MG tablet Take 1 tablet by mouth daily  Patient not taking: Reported on 1/20/2023 8/23/16   Gerhardt Reap, DO   omeprazole (PRILOSEC) 20 MG capsule Take 1 capsule by mouth 2 times daily 8/23/16 10/22/16  Gerhardt Reap, DO   ondansetron (ZOFRAN) 4 MG tablet Take 1 tablet by mouth every 8 hours as needed for Nausea or Vomiting  Patient not taking: Reported on 1/20/2023 7/30/16   Rachid De Los Santos DO       REVIEW OF SYSTEMS       Review of Systems   Constitutional:  Positive for fever. Negative for chills. HENT:  Positive for sore throat and trouble swallowing. Negative for rhinorrhea. Eyes:  Negative for visual disturbance. Respiratory:  Positive for cough and shortness of breath. Blood tinged sputum   Cardiovascular:  Negative for chest pain and leg swelling. Gastrointestinal:  Negative for abdominal pain, constipation, diarrhea, nausea and vomiting. Endocrine: Negative for polyuria. Genitourinary:  Negative for dysuria, frequency and hematuria. Musculoskeletal:  Negative for arthralgias, myalgias and neck pain. Skin:  Negative for pallor. Neurological:  Negative for numbness and headaches. Psychiatric/Behavioral:  Negative for behavioral problems. PHYSICAL EXAM      INITIAL VITALS:   /72   Pulse 78   Temp 97.3 °F (36.3 °C) (Oral)   Resp 23   Ht 6' 1\" (1.854 m)   Wt 165 lb 9.1 oz (75.1 kg)   SpO2 98%   BMI 21.84 kg/m²     Physical Exam  Vitals reviewed. Constitutional:       General: He is not in acute distress. Appearance: Normal appearance. He is not toxic-appearing. HENT:      Head: Normocephalic and atraumatic. Nose: No congestion or rhinorrhea. Mouth/Throat:      Mouth: Mucous membranes are moist.      Tonsils: No tonsillar exudate. 3+ on the right. Eyes:      Conjunctiva/sclera: Conjunctivae normal.      Pupils: Pupils are equal, round, and reactive to light. Cardiovascular:      Rate and Rhythm: Normal rate and regular rhythm. Pulses: Normal pulses. Heart sounds: No murmur heard. Pulmonary:      Effort: Pulmonary effort is normal. No respiratory distress. Breath sounds: Normal breath sounds. No wheezing. Abdominal:      General: Bowel sounds are normal. There is no distension. Palpations: Abdomen is soft. Tenderness: There is no abdominal tenderness. There is no guarding or rebound. Musculoskeletal:      Right lower leg: No edema. Left lower leg: No edema. Skin:     General: Skin is warm and dry. Neurological:      Mental Status: He is alert and oriented to person, place, and time.    Psychiatric:         Mood and Affect: Mood normal.         Behavior: Behavior normal.         Judgment: Judgment normal.         DDX/DIAGNOSTIC RESULTS / EMERGENCY DEPARTMENT COURSE / MDM     Medical Decision Making  62-year-old male with a history significant for KENJI presenting with 3 weeks of cough, sore throat, and shortness of breath that is progressively worsened. Over the last 3 days patient developed hemoptysis. Initially, the patient is having fits of coughing at which time he feels as though his throat is closing. On exam, he saturating well on room air, does not have any increased work of breathing, lungs are clear to auscultation bilaterally he additionally has left-sided tonsillar swelling. .  Differential includes pneumonia versus pulmonary embolism versus bronchitis versus peritonsillar abscess versus cervical soft tissue infection. Will check CBC, BMP, chest x-ray, EKG, D-dimer, troponin, and CT soft tissue of the neck with contrast.  We will give the patient a dose of Decadron for his pharyngeal edema. Amount and/or Complexity of Data Reviewed  Labs: ordered. Radiology: ordered. ECG/medicine tests: ordered. Risk  Prescription drug management. Decision regarding hospitalization. Critical Care  Total time providing critical care: 30-74 minutes      EKG  EKG Interpretation    Normal sinus rhythm with ventricular rate of 81. Intervals normal, . No ST or T wave changes. All EKG's are interpreted by the Emergency Department Physician who either signs or Co-signs this chart in the absence of a cardiologist.    EMERGENCY DEPARTMENT COURSE:    ED Course as of 01/20/23 0732   Fri Jan 20, 2023   0322 Patient's strep screen positive. Mild leukocytosis to 12.5. Additionally, D-dimer elevated at 0.80. We will add on CT PE study. [BL]   0326 Creatinine elevated at 1.28, near patient's baseline. [BL]   0345 X-ray showed no acute cardiopulmonary process. [BL]   0424 Patient has a leukocytosis of 12.5. Patient positive for strep, patient has been afebrile, not tachycardic. Not currently meeting sepsis criteria. We will consult ENT for further recommendations regarding antibiotics.  [BL]   [id number] Radiology called - small PE in proximal portion upper lobe [CK]   0922 Spoke with ENT regarding clinical findings and CT read they state the L sided abscess is too small to drain. They agree with Unasyn for IV abx. [BL]   0501 Bedside ultrasound showing no evidence of right heart strain. Good ventricular function. Consult [facility] for admission. [BL]   5488 [facility] with [facility] who will admit the patient. [BL]      ED Course User Index  [BL] Margareth Chaves DO  [CK] Gary Pearson MD       PROCEDURES:  None    CONSULTS:  IP CONSULT TO OTOLARYNGOLOGY  IP CONSULT TO HOSPITALIST    CRITICAL CARE:  There was significant risk of life threatening deterioration of patient's condition requiring my direct management. Critical care time 40 minutes, excluding any documented procedures. FINAL IMPRESSION      1. Single subsegmental pulmonary embolism without acute cor pulmonale (HCC)    2. Strep pharyngitis    3. Peritonsillar abscess          DISPOSITION / PLAN     DISPOSITION Admitted 01/20/2023 05:20:22 AM      PATIENT REFERRED TO:  No follow-up provider specified.     DISCHARGE MEDICATIONS:  Current Discharge Medication List          Margareth Chaves DO  Emergency Medicine Resident    (Please note that portions of thisnote were completed with a voice recognition program.  Efforts were made to edit the dictations but occasionally words are mis-transcribed.)        Margareth Chaves DO  Resident  01/20/23 2002

## 2023-01-20 NOTE — PLAN OF CARE
Problem: Discharge Planning  Goal: Discharge to home or other facility with appropriate resources  Outcome: Progressing  Flowsheets (Taken 1/20/2023 0618)  Discharge to home or other facility with appropriate resources:   Identify barriers to discharge with patient and caregiver   Identify discharge learning needs (meds, wound care, etc)   Refer to discharge planning if patient needs post-hospital services based on physician order or complex needs related to functional status, cognitive ability or social support system   Arrange for needed discharge resources and transportation as appropriate   Arrange for interpreters to assist at discharge as needed     Problem: ABCDS Injury Assessment  Goal: Absence of physical injury  Outcome: Progressing     Problem: Pain  Goal: Verbalizes/displays adequate comfort level or baseline comfort level  Outcome: Progressing

## 2023-01-20 NOTE — PROGRESS NOTES
Physical Therapy        Physical Therapy Cancel Note      DATE: 2023    NAME: Otoniel Gorman  MRN: [id number]   : 1965      Patient not seen this date for Physical Therapy due to:    Patient independent with functional mobility. Will defer PT evaluation at this time. Please reorder PT if future needs arise. Pt notes baseline mobility, denies any safety mobility concerns and requests to defer physical therapy. Electronically signed by LUIS FERNANDO Lai on 2023 at 11:26 AM    This treatment/evaluation completed by signing SPT. Signing PT agrees with treatment and documentation.

## 2023-01-20 NOTE — PLAN OF CARE
Problem: Discharge Planning  Goal: Discharge to home or other facility with appropriate resources  1/20/2023 1818 by Riddhi Singleton RN  Outcome: Progressing  1/20/2023 0700 by Michael Estrella RN  Outcome: Progressing  Flowsheets (Taken 1/20/2023 3965)  Discharge to home or other facility with appropriate resources:   Identify barriers to discharge with patient and caregiver   Identify discharge learning needs (meds, wound care, etc)   Refer to discharge planning if patient needs post-hospital services based on physician order or complex needs related to functional status, cognitive ability or social support system   Arrange for needed discharge resources and transportation as appropriate   Arrange for interpreters to assist at discharge as needed     Problem: ABCDS Injury Assessment  Goal: Absence of physical injury  1/20/2023 1818 by Riddhi Singleton RN  Outcome: Progressing  1/20/2023 0700 by Michael Estrella RN  Outcome: Progressing     Problem: Pain  Goal: Verbalizes/displays adequate comfort level or baseline comfort level  1/20/2023 1818 by Riddhi Singleton RN  Outcome: Progressing  1/20/2023 0700 by Michael Estrella RN  Outcome: Progressing

## 2023-01-20 NOTE — PROGRESS NOTES
[facility])  Occupational Therapy Not Seen Note    DATE: 2023    NAME: Yasmine Nunez  MRN: [id number]   : 1965      Patient not seen this date for Occupational Therapy due to:    Spoke with pt with PT and PT student present. Pt denies needs and reports IND in ADL's and functional mobility. Observed pt walking to bathroom for toileting and then to stretcher for testing. Pt required no assistance and appears steady throughout. Patient independent with ADLs and functional tasks with no acute OT needs. Will defer OT evaluation at this time. Please reorder OT if future needs arise.      Electronically signed by EMILEE Hall on 2023 at 11:56 AM

## 2023-01-20 NOTE — CONSULTS
CONSULTING SERVICE: Otolaryngology-Head and Neck Surgery    Informant:   The history was obtained from the patient. Chief Complaint:   His chief complaint is peritonsillar abscess     History of Present Illness:   Feroz Escoto is a 61 y.o. male seen consultation at the request of Dr. Dulce Spencer on 1/20/2023. Patient who presented to the ED earlier this morning with throat pain for the last few weeks. Over the last 3 to 4 days he has had worsening shortness of breath, difficulty swallowing, and hemoptysis. He states has had tactile fevers at home. He states he had worsening swelling sensation in his throat. During my assessment, patient states that he feels much better. Pain has started to subside and he can now swallow without any breathing. No difficulty breathing or shortness of breath. Would like to eat today if possible. Currently on heparin qtt for PE. Other Pertinent ENT-specific HPI:  None    Pertinent Social/Birth/Family/Medical/Surgical History   None    Examination:   Vital Signs   Vitals:    01/20/23 0500 01/20/23 0515 01/20/23 0609 01/20/23 0613   BP: 129/88 129/83 124/72    Pulse: 81 76 78    Resp: 16 23 23    Temp:   97.3 °F (36.3 °C)    TempSrc:   Oral    SpO2: 96% 96% 98%    Weight:    165 lb 9.1 oz (75.1 kg)   Height:    6' 1\" (1.854 m)       Constitutional   General Appearance: well developed and well nourished and in no acute distress  Speech: age appropriate  Head & Face   Head:   normocephalic and symmetric  Eyes: no eyelid swelling, no conjunctival injection or exudate, pupils equal round and reactive to light  Ears   Right EXT: normal  Right EAC: patent    Left EXT: normal  Left EAC: patent      Hearing: is responsive to whispered voice. Nose   Dorsum: dorsum midline  Nasal mucosa: no edema. Rhinorrhea: no drainage  Septum: midline.  No perforation  Turbinates: no inferior turbinate hypertrophy  Nasopharynx Unable to perform indirect mirror laryngoscopy due to patient age and intolerance of exam    Oral Cavity, Oropharynx   Lips: normal  Dentition: dentition grossly normal   Oral mucosa: moist, pink  Gums: normal  Palate: intact, mobile  Pharynx: intact mobile  Posterior pharyngeal wall: normal  Tongue: intact, full range of motion; floor of mouth: no lesions  Tonsil size:  3+ on right 2+ on left  Neck   Trachea: midline  Thyroid: normal  Salivary glands: no parotid or submandibular masses or tenderness noted. Lymphatic Nodes: palpable nodes bilaterally  Respiratory   Auscultation: not examined  Effort: no retractions noted  Voice: clear  Chest movement: symmetrical  Cardiac   Auscultation: not examined   Neuro/ Psych   Cranial Nerves: II-XII intact  Orientation: age appropriate  Mood & Affect: age appropriate    Additional data reviewed:      CT Scan of soft tissue neck 1/20/23  Impression   Moderately enlarged right palatine tonsil bulging medially and superiorly. Within the substance of right palatine tonsil there is ill-defined   low-density suggestive of diffuse inflammatory changes, consistent with   tonsillitis but there is no distinct formed abscess within the right tonsil   or around the right tonsil. The left palatine tonsil is mildly enlarged with a small 4.6 mm size   hypodense focus in the upper portion of this left palatine tonsil. This   small hyperdense focus may represent small abscess or cyst.  No additional   focal abnormality within or around left tonsil. Evidence of significant lymphadenopathy in the right jugulodigastric area   without suppurative lymphadenitis. Mild lymphadenopathy in the left   jugulodigastric area and bilateral posterior triangles as well as bilateral   submandibular areas. Procedures:    None    Surgical risk factors:  None    -----------------------------------------------------------------  Visit Diagnosis/Assessment:   Reyes Bhatia is a 61 y.o. male with:  1.  Single subsegmental pulmonary embolism without acute cor pulmonale ([facility]    2. Strep pharyngitis    3.  Peritonsillar abscess        Plan:  - No identifiable area of abscess that requires drainage on CT scan  - Continue with IV antibiotics per primary team and reassess tomorrow  - OK for regular diet      --------------------------------------------------  MARIANNE Blackburn - CNP  Pediatric Otolaryngology-Head and Neck Surgery  [address] [place] [address] Otolaryngology group    Office  ph# [phone]    Also available in [facility]

## 2023-01-20 NOTE — H&P
[facility]  Office: [address], DO, Sanabasilia Mitchell, DO, Mark Mervin, DO, Suki Dickerson, DO, Mary Burger MD, Anastasiya Simon MD, Julian Bustillo MD, Robert Wright MD,  Ciro Pallas, MD, Venkata Jamison MD, Carla Merino, DO, Uri Price MD,  Luis Alberto Springer MD, Patricia De Souza MD, Dorothea Escobar DO, Yamil Monroe MD, Mae Jenkins MD, Francisco Newsome DO, Rubén Delarosa MD, Angel Jennings MD, Janeth Reed MD, Beryle Marseille, MD, Marcell Moraes, DO, Yuliya Little MD, Drema Hodgkins, MD, Laury Diaz, CNP,  Cathie Matias, CNP, Adam Fish, CNP, Chris Truong, CNP,  Mortimer Guardian, [facility] [place], Deena Girard, CNP, Erik Vale, CNP, Veena Flores, CNP, Lico Parnell, CNP, Karishma Church, CNP, Raghu Breaux PA-C, Matt Bobo, CNS, Parul Calle, CNP, Julito Ocampo, [address]    HISTORY AND PHYSICAL EXAMINATION            Date:   1/20/2023  Patient name:  Tiffanie Marti  Date of admission:  1/20/2023  2:14 AM  MRN:   [id number]  Account:  [de-identified]  YOB: 1965  PCP:    No primary care provider on file. Room:   [address] [place] [address]  Code Status:    Full Code    Chief Complaint:     Chief Complaint   Patient presents with    Pharyngitis       History Obtained From:     patient, electronic medical record    History of Present Illness:     Tiffanie Marti is a 61 y.o. Non- / non  male who presents with Pharyngitis   and is admitted to the hospital for the management of Peritonsillar abscess. Patient with known past medical history of KENJI, GERD who presented to ER with progressively worsening shortness of breath, sore throat, difficulty swallowing and subjective fever, he also started coughing up blood  In the ER patient was found to have low-grade fever, has some leukocytosis.   Strep throat  Imaging revealed early formation of peritonsillar abscess, given slightly evaded D-dimer CT chest PE was also performed that revealed PE in proximal portion of the upper lobar branch of the right pulmonary artery . Patient was admitted for further management    Past Medical History:     Past Medical History:   Diagnosis Date    H. pylori infection     KENJI (obstructive sleep apnea) 6/14/2012        Past Surgical History:     Past Surgical History:   Procedure Laterality Date    UPPER GASTROINTESTINAL ENDOSCOPY  08/23/2016        Medications Prior to Admission:     Prior to Admission medications    Medication Sig Start Date End Date Taking? Authorizing Provider   ibuprofen (IBU) 400 MG tablet Take 1 tablet by mouth every 8 hours as needed for Pain  Patient not taking: Reported on 1/20/2023 12/8/22   Laura Silvius, DO   acetaminophen (TYLENOL) 500 MG tablet Take 2 tablets by mouth every 8 hours as needed for Pain  Patient not taking: Reported on 1/20/2023 12/8/22   Laura Silvius, DO   dicyclomine (BENTYL) 10 MG capsule Take 1 capsule by mouth every 6 hours as needed (cramps)  Patient not taking: Reported on 1/20/2023 8/23/16   Gerhardt Reap, DO   famotidine (PEPCID) 40 MG tablet Take 1 tablet by mouth daily  Patient not taking: Reported on 1/20/2023 8/23/16   Gerhardt Reap, DO   omeprazole (PRILOSEC) 20 MG capsule Take 1 capsule by mouth 2 times daily 8/23/16 10/22/16  Gerhardt Reap, DO   ondansetron (ZOFRAN) 4 MG tablet Take 1 tablet by mouth every 8 hours as needed for Nausea or Vomiting  Patient not taking: Reported on 1/20/2023 7/30/16   Rachid De Los Santos DO        Allergies:     Patient has no known allergies. Social History:     Tobacco:    reports that he has been smoking cigarettes. He has a 10.00 pack-year smoking history. He has never used smokeless tobacco.  Alcohol:      reports no history of alcohol use. Drug Use:  reports no history of drug use.     Family History:     Family History   Problem Relation Age of Onset    Cancer Mother     High Blood Pressure Mother     Stroke Sister Review of Systems:     Positive and Negative as described in HPI. Review of Systems   Constitutional:  Positive for activity change. Negative for chills, diaphoresis and fever. HENT:  Positive for sore throat and trouble swallowing (improved). Negative for congestion and hearing loss. Respiratory:  Positive for shortness of breath. Negative for cough, wheezing and stridor. Cardiovascular:  Negative for chest pain, palpitations and leg swelling. Gastrointestinal:  Negative for abdominal pain, blood in stool, constipation, diarrhea, nausea and vomiting. Genitourinary:  Negative for dysuria and frequency. Musculoskeletal:  Negative for myalgias. Skin:  Negative for rash. Neurological:  Negative for dizziness, seizures and headaches. Psychiatric/Behavioral:  The patient is not nervous/anxious. Physical Exam:   /72   Pulse 78   Temp 97.3 °F (36.3 °C) (Oral)   Resp 23   Ht 6' 1\" (1.854 m)   Wt 165 lb 9.1 oz (75.1 kg)   SpO2 98%   BMI 21.84 kg/m²   Temp (24hrs), Av.7 °F (37.1 °C), Min:97.3 °F (36.3 °C), Max:100.1 °F (37.8 °C)    No results for input(s): POCGLU in the last 72 hours. No intake or output data in the 24 hours ending 23 0807    Physical Exam  Vitals and nursing note reviewed. Constitutional:       General: He is not in acute distress. Appearance: He is well-developed. He is not diaphoretic. HENT:      Head: Normocephalic and atraumatic. Right Ear: Hearing normal.      Left Ear: Hearing normal.      Nose: Nose normal. No rhinorrhea. Mouth/Throat:      Pharynx: Posterior oropharyngeal erythema present. Eyes:      General: Lids are normal.      Extraocular Movements:      Right eye: Normal extraocular motion. Left eye: Normal extraocular motion. Conjunctiva/sclera: Conjunctivae normal.      Right eye: Right conjunctiva is not injected. Left eye: Left conjunctiva is not injected.       Pupils: Pupils are equal, round, and reactive to light. Pupils are equal.      Right eye: Pupil is reactive. Left eye: Pupil is reactive. Neck:      Thyroid: No thyromegaly. Vascular: No carotid bruit. Trachea: Trachea and phonation normal. No tracheal deviation. Cardiovascular:      Rate and Rhythm: Normal rate and regular rhythm. Pulses: Normal pulses. Heart sounds: Normal heart sounds. No murmur heard. Pulmonary:      Effort: Pulmonary effort is normal. No respiratory distress. Breath sounds: Normal breath sounds. No stridor. No decreased breath sounds. Abdominal:      General: Bowel sounds are normal. There is no distension. Palpations: Abdomen is soft. There is no mass. Tenderness: There is no abdominal tenderness. There is no guarding. Musculoskeletal:         General: No tenderness. Cervical back: Neck supple. Skin:     General: Skin is warm and dry. Findings: No erythema, lesion or rash. Neurological:      Mental Status: He is alert and oriented to person, place, and time. He is not disoriented. Cranial Nerves: No cranial nerve deficit. Psychiatric:         Speech: Speech normal.         Behavior: Behavior normal. Behavior is cooperative. Investigations:      Laboratory Testing:  Recent Results (from the past 24 hour(s))   Strep Screen Group A Throat    Collection Time: 01/20/23  2:45 AM    Specimen: Throat   Result Value Ref Range    Source . THROAT SWAB     Strep A Ag POSITIVE (A) NEGATIVE   CBC with Auto Differential    Collection Time: 01/20/23  2:50 AM   Result Value Ref Range    WBC 12.5 (H) 3.5 - 11.3 k/uL    RBC 4.25 4.21 - 5.77 m/uL    Hemoglobin 13.3 13.0 - 17.0 g/dL    Hematocrit 42.4 40.7 - 50.3 %    MCV 99.8 82.6 - 102.9 fL    MCH 31.3 25.2 - 33.5 pg    MCHC 31.4 28.4 - 34.8 g/dL    RDW 13.5 11.8 - 14.4 %    Platelets 258 295 - 558 k/uL    MPV 9.9 8.1 - 13.5 fL    NRBC Automated 0.0 0.0 per 100 WBC    Seg Neutrophils 72 (H) 36 - 65 %    Lymphocytes 15 (L) 24 - 43 %    Monocytes 9 3 - 12 %    Eosinophils % 2 1 - 4 %    Basophils 1 0 - 2 %    Immature Granulocytes 1 (H) 0 %    Segs Absolute 9.18 (H) 1.50 - 8.10 k/uL    Absolute Lymph # 1.81 1.10 - 3.70 k/uL    Absolute Mono # 1.11 0.10 - 1.20 k/uL    Absolute Eos # 0.28 0.00 - 0.44 k/uL    Basophils Absolute 0.06 0.00 - 0.20 k/uL    Absolute Immature Granulocyte 0.06 0.00 - 0.30 k/uL   BMP    Collection Time: 01/20/23  2:50 AM   Result Value Ref Range    Glucose 110 (H) 70 - 99 mg/dL    BUN 16 6 - 20 mg/dL    Creatinine 1.28 (H) 0.70 - 1.20 mg/dL    Est, Glom Filt Rate >60 >60 mL/min/1.73m2    Calcium 9.3 8.6 - 10.4 mg/dL    Sodium 132 (L) 135 - 144 mmol/L    Potassium 3.9 3.7 - 5.3 mmol/L    Chloride 98 98 - 107 mmol/L    CO2 23 20 - 31 mmol/L    Anion Gap 11 9 - 17 mmol/L   D-Dimer, Quantitative    Collection Time: 01/20/23  2:50 AM   Result Value Ref Range    D-Dimer, Quant 0.80 mg/L FEU   Troponin    Collection Time: 01/20/23  2:50 AM   Result Value Ref Range    Troponin, High Sensitivity 10 0 - 22 ng/L   Troponin    Collection Time: 01/20/23  4:13 AM   Result Value Ref Range    Troponin, High Sensitivity 11 0 - 22 ng/L   APTT    Collection Time: 01/20/23  5:29 AM   Result Value Ref Range    PTT 29.4 20.5 - 30.5 sec   CBC    Collection Time: 01/20/23  6:59 AM   Result Value Ref Range    WBC 13.8 (H) 3.5 - 11.3 k/uL    RBC 3.83 (L) 4.21 - 5.77 m/uL    Hemoglobin 13.2 13.0 - 17.0 g/dL    Hematocrit 38.9 (L) 40.7 - 50.3 %    .6 82.6 - 102.9 fL    MCH 34.5 (H) 25.2 - 33.5 pg    MCHC 33.9 28.4 - 34.8 g/dL    RDW 14.0 11.8 - 14.4 %    Platelets 787 (H) 891 - 453 k/uL    MPV 12.4 8.1 - 13.5 fL    NRBC Automated 0.2 (H) 0.0 per 100 WBC   APTT    Collection Time: 01/20/23  6:59 AM   Result Value Ref Range    PTT 96.8 (HH) 20.5 - 30.5 sec       Imaging/Diagnostics:  XR CHEST (2 VW)    Result Date: 1/20/2023  No acute cardiopulmonary process.      CT SOFT TISSUE NECK W CONTRAST    Result Date: 1/20/2023  Moderately enlarged right palatine tonsil bulging medially and superiorly. Within the substance of right palatine tonsil there is ill-defined low-density suggestive of diffuse inflammatory changes, consistent with tonsillitis but there is no distinct formed abscess within the right tonsil or around the right tonsil. The left palatine tonsil is mildly enlarged with a small 4.6 mm size hypodense focus in the upper portion of this left palatine tonsil. This small hyperdense focus may represent small abscess or cyst.  No additional focal abnormality within or around left tonsil. Evidence of significant lymphadenopathy in the right jugulodigastric area without suppurative lymphadenitis. Mild lymphadenopathy in the left jugulodigastric area and bilateral posterior triangles as well as bilateral submandibular areas. CT CHEST PULMONARY EMBOLISM W CONTRAST    Result Date: 1/20/2023  There are vascular motion induced artifacts involving the right hilar and perihilar structures. Even then, there are findings suggesting intraluminal filling defect in the proximal portion of the upper lobar branch of the right pulmonary artery and these findings highly suggestive of small pulmonary embolism. In the rest of the pulmonary arterial systems of both sides there is no additional pulmonary embolism identified. Normal thoracic aorta, without evidence of aneurysm or dissection. No acute process in the mediastinum. Minimal posterior basilar dependent atelectatic changes in the lungs. No other demonstrable abnormality in lungs or in the pleural spaces. The findings have been discussed by me with the on-site attending physician at [phone] hours on 01/20/2023.        Assessment :      Hospital Problems             Last Modified POA    * (Principal) Peritonsillar abscess 1/20/2023 Yes    Acute pulmonary embolism without acute cor pulmonale (HCC) 1/20/2023 Yes    Streptococcal pharyngitis 1/20/2023 Yes    KENJI (obstructive sleep apnea) 1/20/2023 Yes Plan:     Patient status inpatient in the Progressive Unit/Step down    Principal Problem:    Peritonsillar abscess  Active Problems:    Acute pulmonary embolism without acute cor pulmonale (HCC)    Streptococcal pharyngitis    Leukocytosis    CKD (chronic kidney disease)    KENJI (obstructive sleep apnea)  Resolved Problems:    * No resolved hospital problems. *    IV antibiotic. ENT eval.  Cleared for general diet  multiple doses of  Received multiple doses of steroids, will DC Decadron   Anticoagulation with heparin drip can switch to p.o. once confirmed no intervention planned  Get lower extremity Doppler. GI prophylaxis. Reassess for discharge in a.m. if ENT cleared    Consultations:   [address] TO HOSPITALIST     Patient is admitted as inpatient status because of co-morbidities listed above, severity of signs and symptoms as outlined, requirement for current medical therapies and most importantly because of direct risk to patient if care not provided in a hospital setting. Expected length of stay > 48 hours. Spencer Waite MD  1/20/2023  8:07 AM    Copy sent to Dr. Michelle primary care provider on file.

## 2023-01-20 NOTE — ED NOTES
The following labs were labeled with appropriate pt sticker and tubed to lab:     [x] Blue     [] Lavender   [] on ice  [] Green/yellow  [] Green/black [] on ice  [] Valri Pancoast  [] on ice  [] Yellow  [] Red  [] Type/ Screen  [] ABG  [] VBG    [] COVID-19 swab    [] Rapid  [] PCR  [] Flu swab  [] Peds Viral Panel     [] Urine Sample  [] Fecal Sample  [] Pelvic Cultures  [] Blood Cultures  [] X 2  [] STREP Cultures         Faith Boxer, RN  01/20/23 7804

## 2023-01-20 NOTE — ED PROVIDER NOTES
[address] [facility]   Emergency Department  Faculty Attestation       I performed a history and physical examination of the patient and discussed management with the resident. I reviewed the residents note and agree with the documented findings including all diagnostic interpretations and plan of care. Any areas of disagreement are noted on the chart. I was personally present for the key portions of any procedures. I have documented in the chart those procedures where I was not present during the key portions. I have reviewed the emergency nurses triage note. I agree with the chief complaint, past medical history, past surgical history, allergies, medications, social and family history as documented unless otherwise noted below. Documentation of the HPI, Physical Exam and Medical Decision Making performed by scribnathalie is based on my personal performance of the HPI, PE and MDM. For Physician Assistant/ Nurse Practitioner cases/documentation I have personally evaluated this patient and have completed at least one if not all key elements of the E/M (history, physical exam, and MDM). Additional findings are as noted. Pertinent Comments     Primary Care Physician: On File Not (Inactive)      ED Triage Vitals [01/20/23 0220]   BP Temp Temp Source Heart Rate Resp SpO2 Height Weight   (!) 136/94 100.1 °F (37.8 °C) Oral 89 16 95 % -- --          This is a 61 y.o. male who presents to the Emergency Department with cough for ~ 3 months. Now having blood streaking in his sputum. Reporting shortness of breath.         Critical Care: {CCTime:03077::\"None \"}    Inocencia Willard MD  Attending Emergency Physician left tonsil is more protuberan but smaller than the right with mild exudate. Controlling secretions. No trismus. There is mild cervical lymphadenopathy. Heart sounds regular. Lungs clear to auscultation. Abdomen is soft and nontender. Legs with no swelling or edema. Alert and oriented with no focal deficits. Medical Decision Making  Cough and hemoptysis - likely a URI vs bronchitis, but cannot rule out PE given fevers and hemoptysis. Will get D-dimer. Sore throat - there is edema and assymetric hypertrophy concerning for possible peritonsilar abscess on the right. There is also exudate on the left. Will get strep swab and ct soft tissue neck. Patient is controlling airway at this time. No management required but will give decadron    Check basic labs and EKG. CT soft tissue neck with edema and swelling on the right and very small abscess on the left. Strep positive  Antibiotics started  ENT consult - watch and manage,  nothing to drain. D-dimer is 0.8 - CT r/o PE obtained and showed small PE. Start heparin. Bedside point of care ultrasound performed. Cardiac US with no signs of heart strain. Read/report available in Epic under imaging section and images available in PACS. Admit       Problems Addressed:  Peritonsillar abscess: acute illness or injury  Single subsegmental pulmonary embolism without acute cor pulmonale ([facility] [address]): acute illness or injury  Strep pharyngitis: acute illness or injury    Amount and/or Complexity of Data Reviewed  Labs: ordered. Decision-making details documented in ED Course. Radiology: ordered and independent interpretation performed. ECG/medicine tests: ordered and independent interpretation performed. Discussion of management or test interpretation with external provider(s): Resident physician spoke to ENT     Risk  Prescription drug management. Decision regarding hospitalization.     Critical Care  Total time providing critical care: < 30 minutes      EKG Interpretation    Interpreted by emergency department physician    Clinical Impression: Normal sinus with no signs of acute ischemia (of note when you look at documented read in chart it says that t wave is no logner inverted compared to EKG on the same study this is due to the fact that on the original EKG there was a lead limb reversal and it was immediately repeated. Therefore the initial EKG was not saved)>     Edna Trinidad MD        Critical Care: There was significant risk of life threatening deterioration of patient's condition requiring my direct management. Critical care time 10 minutes, excluding any documented procedures.     Edna Trinidad MD  Attending Emergency Physician        Edna Trinidad MD  01/25/23 9692

## 2023-01-21 VITALS
DIASTOLIC BLOOD PRESSURE: 82 MMHG | SYSTOLIC BLOOD PRESSURE: 125 MMHG | TEMPERATURE: 98.3 F | WEIGHT: 165.57 LBS | BODY MASS INDEX: 21.94 KG/M2 | HEART RATE: 71 BPM | OXYGEN SATURATION: 97 % | HEIGHT: 73 IN | RESPIRATION RATE: 13 BRPM

## 2023-01-21 LAB
ANION GAP SERPL CALCULATED.3IONS-SCNC: 11 MMOL/L (ref 9–17)
BUN BLDV-MCNC: 24 MG/DL (ref 6–20)
CALCIUM SERPL-MCNC: 9.1 MG/DL (ref 8.6–10.4)
CHLORIDE BLD-SCNC: 101 MMOL/L (ref 98–107)
CO2: 22 MMOL/L (ref 20–31)
CREAT SERPL-MCNC: 1.2 MG/DL (ref 0.7–1.2)
GFR SERPL CREATININE-BSD FRML MDRD: >60 ML/MIN/1.73M2
GLUCOSE BLD-MCNC: 145 MG/DL (ref 70–99)
INR BLD: 1
PARTIAL THROMBOPLASTIN TIME: 42.7 SEC (ref 20.5–30.5)
PARTIAL THROMBOPLASTIN TIME: 85.8 SEC (ref 20.5–30.5)
POTASSIUM SERPL-SCNC: 4.1 MMOL/L (ref 3.7–5.3)
PROTHROMBIN TIME: 10.4 SEC (ref 9.1–12.3)
SODIUM BLD-SCNC: 134 MMOL/L (ref 135–144)

## 2023-01-21 PROCEDURE — 6370000000 HC RX 637 (ALT 250 FOR IP): Performed by: NURSE PRACTITIONER

## 2023-01-21 PROCEDURE — 6360000002 HC RX W HCPCS: Performed by: INTERNAL MEDICINE

## 2023-01-21 PROCEDURE — 85730 THROMBOPLASTIN TIME PARTIAL: CPT

## 2023-01-21 PROCEDURE — 99239 HOSP IP/OBS DSCHRG MGMT >30: CPT | Performed by: INTERNAL MEDICINE

## 2023-01-21 PROCEDURE — 6360000002 HC RX W HCPCS: Performed by: NURSE PRACTITIONER

## 2023-01-21 PROCEDURE — 6370000000 HC RX 637 (ALT 250 FOR IP): Performed by: INTERNAL MEDICINE

## 2023-01-21 PROCEDURE — 85610 PROTHROMBIN TIME: CPT

## 2023-01-21 PROCEDURE — 6360000002 HC RX W HCPCS: Performed by: EMERGENCY MEDICINE

## 2023-01-21 PROCEDURE — 2580000003 HC RX 258: Performed by: NURSE PRACTITIONER

## 2023-01-21 PROCEDURE — 80048 BASIC METABOLIC PNL TOTAL CA: CPT

## 2023-01-21 PROCEDURE — 36415 COLL VENOUS BLD VENIPUNCTURE: CPT

## 2023-01-21 RX ORDER — AMOXICILLIN AND CLAVULANATE POTASSIUM 875; 125 MG/1; MG/1
1 TABLET, FILM COATED ORAL EVERY 12 HOURS SCHEDULED
Status: DISCONTINUED | OUTPATIENT
Start: 2023-01-21 | End: 2023-01-21 | Stop reason: HOSPADM

## 2023-01-21 RX ORDER — BENZONATATE 200 MG/1
200 CAPSULE ORAL 3 TIMES DAILY
Qty: 21 CAPSULE | Refills: 0 | Status: SHIPPED | OUTPATIENT
Start: 2023-01-21 | End: 2023-01-28

## 2023-01-21 RX ORDER — IBUPROFEN 400 MG/1
400 TABLET ORAL EVERY 8 HOURS PRN
Status: DISCONTINUED | OUTPATIENT
Start: 2023-01-21 | End: 2023-01-21

## 2023-01-21 RX ORDER — CALCIUM GLUCONATE 20 MG/ML
2000 INJECTION, SOLUTION INTRAVENOUS ONCE
Status: DISCONTINUED | OUTPATIENT
Start: 2023-01-21 | End: 2023-01-21

## 2023-01-21 RX ORDER — AMOXICILLIN AND CLAVULANATE POTASSIUM 875; 125 MG/1; MG/1
1 TABLET, FILM COATED ORAL EVERY 12 HOURS SCHEDULED
Qty: 20 TABLET | Refills: 0 | Status: SHIPPED | OUTPATIENT
Start: 2023-01-21 | End: 2023-01-31

## 2023-01-21 RX ORDER — ACETAMINOPHEN 500 MG
1000 TABLET ORAL EVERY 8 HOURS PRN
Qty: 360 TABLET | Refills: 0 | COMMUNITY
Start: 2023-01-21

## 2023-01-21 RX ORDER — HEPARIN SODIUM 5000 [USP'U]/ML
5000 INJECTION, SOLUTION INTRAVENOUS; SUBCUTANEOUS EVERY 8 HOURS SCHEDULED
Status: DISCONTINUED | OUTPATIENT
Start: 2023-01-21 | End: 2023-01-21

## 2023-01-21 RX ORDER — HEPARIN SODIUM AND DEXTROSE 10000; 5 [USP'U]/100ML; G/100ML
5-30 INJECTION INTRAVENOUS CONTINUOUS
Status: DISCONTINUED | OUTPATIENT
Start: 2023-01-21 | End: 2023-01-21

## 2023-01-21 RX ORDER — BENZONATATE 100 MG/1
200 CAPSULE ORAL 3 TIMES DAILY
Status: DISCONTINUED | OUTPATIENT
Start: 2023-01-21 | End: 2023-01-21 | Stop reason: HOSPADM

## 2023-01-21 RX ADMIN — SODIUM CHLORIDE, PRESERVATIVE FREE 10 ML: 5 INJECTION INTRAVENOUS at 09:19

## 2023-01-21 RX ADMIN — HEPARIN SODIUM 18 UNITS/KG/HR: 10000 INJECTION, SOLUTION INTRAVENOUS at 01:51

## 2023-01-21 RX ADMIN — SODIUM CHLORIDE 3000 MG: 900 INJECTION INTRAVENOUS at 04:38

## 2023-01-21 RX ADMIN — FAMOTIDINE 40 MG: 20 TABLET, FILM COATED ORAL at 09:19

## 2023-01-21 RX ADMIN — HEPARIN SODIUM 5980 UNITS: 1000 INJECTION, SOLUTION INTRAVENOUS; SUBCUTANEOUS at 04:28

## 2023-01-21 RX ADMIN — APIXABAN 10 MG: 5 TABLET, FILM COATED ORAL at 12:51
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
## 2023-01-21 NOTE — PLAN OF CARE
Problem: Discharge Planning  Goal: Discharge to home or other facility with appropriate resources  1/21/2023 1238 by Philipp Burr RN  Outcome: Completed  1/21/2023 0306 by Drea Puckett RN  Outcome: Progressing     Problem: ABCDS Injury Assessment  Goal: Absence of physical injury  1/21/2023 1238 by Philipp Burr RN  Outcome: Completed  1/21/2023 0306 by Drea Puckett RN  Outcome: Progressing     Problem: Pain  Goal: Verbalizes/displays adequate comfort level or baseline comfort level  1/21/2023 1238 by Philipp Burr RN  Outcome: Completed  1/21/2023 0306 by Drea Puckett RN  Outcome: Progressing

## 2023-01-21 NOTE — DISCHARGE INSTR - DIET

## 2023-01-21 NOTE — PROGRESS NOTES
ENT/OTOLARYNGOLOGY SUBSEQUENT CARE PROGRESS NOTE     REASON FOR CARE: Sore throat and Pulmonary Embolism     HISTORY OF PRESENT ILLNESS:   Radha Mckinnon is a 61 y.o. who is being seen for follow-up of sore throat. He was admitted yesterday for several days of sore throat and did not have a peritonsillar abscess at that time. He reports today that his sore throat is significantly improved today. He was able to eat normally and denies any trouble opening his mouth. He remains on Heparin for treatment of is PE. Pertinent Examination:   GENERAL: well developed and well nourished and in no acute distress  HEAD: normocephalic and atraumatic  EYES: no eyelid swelling, no conjunctival injection or exudate, pupils equal round and reactive to light  EXTERNAL EARS: normal  NOSE: nares patent, normal mucosa  MOUTH/THROAT:  The mucous membranes are moist. The tonsils are mildly enlarged, 2+, but there is no erythema, edema, or exudate. There is no asymmetry. NECK: non-tender, full range of motion, no mass, no focal lymphadenopathy  RESPIRATORY: Breathing Pattern: regular, no distress  NEUROLOGICAL:  cranial nerves II-XII are grossly intact     RELEVANT LABS/STUDIES:   Additional data reviewed:    None    Procedures:    None    Surgical risk factors:  Pulmonary Embolism and Anticoagulation     IMPRESSION AND RECOMMENDATIONS:   Radha Mckinnon is a 61 y.o. male with improving pharyngitis. Plan:  - Continue antibiotics. He could be switched to oral Augmentin from ENT standpoint.  - No surgical intervention is needed. - Continue PE treatment per primary team.  - ENT will sign off.  Please call with questions change in patient condition.       --------------------------------------------------  Ronald Hare MD  Pediatric Otolaryngology-Head and Neck [facility]    Office  ph# [phone]    Also available in [address] [place] [address]

## 2023-01-21 NOTE — PLAN OF CARE
Problem: Discharge Planning  Goal: Discharge to home or other facility with appropriate resources  1/21/2023 0306 by Manuela Dance, RN  Outcome: Progressing  1/20/2023 1818 by Tejal Saunders RN  Outcome: Progressing     Problem: ABCDS Injury Assessment  Goal: Absence of physical injury  1/21/2023 0306 by Manuela Dance, RN  Outcome: Progressing  1/20/2023 1818 by Tejal Saunders RN  Outcome: Progressing     Problem: Pain  Goal: Verbalizes/displays adequate comfort level or baseline comfort level  1/21/2023 0306 by Manuela Dance, RN  Outcome: Progressing  1/20/2023 1818 by Tejal Saunders RN  Outcome: Progressing

## 2023-01-22 LAB
EKG ATRIAL RATE: 84 BPM
EKG P-R INTERVAL: 194 MS
EKG Q-T INTERVAL: 362 MS
EKG QRS DURATION: 84 MS
EKG QTC CALCULATION (BAZETT): 427 MS
EKG R AXIS: 147 DEGREES
EKG T AXIS: 148 DEGREES
EKG VENTRICULAR RATE: 84 BPM

## 2023-01-23 LAB
EKG ATRIAL RATE: 81 BPM
EKG P AXIS: 81 DEGREES
EKG P-R INTERVAL: 194 MS
EKG Q-T INTERVAL: 360 MS
EKG QRS DURATION: 86 MS
EKG QTC CALCULATION (BAZETT): 418 MS
EKG R AXIS: 77 DEGREES
EKG T AXIS: 57 DEGREES
EKG VENTRICULAR RATE: 81 BPM

## 2023-07-03 ENCOUNTER — HOSPITAL ENCOUNTER (EMERGENCY)
Age: 58
Discharge: HOME | End: 2023-07-03
Attending: EMERGENCY MEDICINE
Payer: MEDICAID

## 2023-07-03 VITALS
WEIGHT: 169.09 LBS | RESPIRATION RATE: 12 BRPM | BODY MASS INDEX: 22.31 KG/M2 | TEMPERATURE: 96.8 F | HEART RATE: 60 BPM | DIASTOLIC BLOOD PRESSURE: 59 MMHG | SYSTOLIC BLOOD PRESSURE: 104 MMHG | OXYGEN SATURATION: 97 %

## 2023-07-03 DIAGNOSIS — L98.8 SKIN MACERATION: Primary | ICD-10-CM

## 2023-07-03 PROCEDURE — 6370000000 HC RX 637 (ALT 250 FOR IP)

## 2023-07-03 PROCEDURE — 99283 EMERGENCY DEPT VISIT LOW MDM: CPT

## 2023-07-03 RX ORDER — IBUPROFEN 800 MG/1
800 TABLET ORAL ONCE
Status: COMPLETED | OUTPATIENT
Start: 2023-07-03 | End: 2023-07-03

## 2023-07-03 RX ORDER — CLOTRIMAZOLE 1 %
CREAM (GRAM) TOPICAL
Qty: 14 G | Refills: 1 | Status: SHIPPED | OUTPATIENT
Start: 2023-07-03 | End: 2023-07-10

## 2023-07-03 RX ORDER — ACETAMINOPHEN 325 MG/1
650 TABLET ORAL ONCE
Status: COMPLETED | OUTPATIENT
Start: 2023-07-03 | End: 2023-07-03

## 2023-07-03 RX ADMIN — IBUPROFEN 800 MG: 800 TABLET, FILM COATED ORAL at 05:42

## 2023-07-03 RX ADMIN — ACETAMINOPHEN 650 MG: 325 TABLET ORAL at 05:42

## 2023-07-03 ASSESSMENT — ENCOUNTER SYMPTOMS
GASTROINTESTINAL NEGATIVE: 1
RESPIRATORY NEGATIVE: 1
BACK PAIN: 0
COLOR CHANGE: 1

## 2023-07-03 NOTE — DISCHARGE INSTRUCTIONS
-You were seen and evaluated by emergency medicine physicians at [facility].    -Please follow-up with your primary care physician and/or with the referrals to specialist.    -You were diagnosed with: Skin maceration / skin breakdown from moisture    -Your feet showed no signs of infection.  -Keep your feet DRY and OPEN to the air as much as possible. -Apply Clotrimazole twice a day to help prevent any fungal infection.  -Wear warm, dry socks as much as possible. -Please return to the Emergency Department if you are experiencing the following symptoms acutely: Worsening foot pain , Headache, fever, chills, nausea, vomiting, chest pain, shortness of breath, abdominal pain, change with urination, change with bowel movements, change in your skin/hair/nail, weakness, fatigue, altered mental status and/or any change from baseline health.     -Thank you for coming to [facility].

## 2023-07-03 NOTE — ED NOTES
Pt present to triage c/o bilateral foot pain. Pt states he been walking all night, his feet are wet and feel sore. Pt awake, alert and oriented, not in distress. Denies SOB or Chest pain.      Camilo Avery RN  07/03/23 2003

## 2023-09-12 ENCOUNTER — HOSPITAL ENCOUNTER (EMERGENCY)
Age: 58
Discharge: HOME | End: 2023-09-12
Attending: EMERGENCY MEDICINE
Payer: MEDICAID

## 2023-09-12 ENCOUNTER — APPOINTMENT (OUTPATIENT)
Dept: GENERAL RADIOLOGY | Age: 58
End: 2023-09-12
Payer: MEDICAID

## 2023-09-12 VITALS
SYSTOLIC BLOOD PRESSURE: 133 MMHG | RESPIRATION RATE: 16 BRPM | DIASTOLIC BLOOD PRESSURE: 83 MMHG | HEART RATE: 65 BPM | OXYGEN SATURATION: 98 % | HEIGHT: 73 IN | WEIGHT: 170 LBS | TEMPERATURE: 97 F | BODY MASS INDEX: 22.53 KG/M2

## 2023-09-12 DIAGNOSIS — M25.562 ACUTE PAIN OF LEFT KNEE: Primary | ICD-10-CM

## 2023-09-12 PROCEDURE — 99284 EMERGENCY DEPT VISIT MOD MDM: CPT

## 2023-09-12 PROCEDURE — 73562 X-RAY EXAM OF KNEE 3: CPT

## 2023-09-12 PROCEDURE — 96372 THER/PROPH/DIAG INJ SC/IM: CPT

## 2023-09-12 PROCEDURE — 6370000000 HC RX 637 (ALT 250 FOR IP): Performed by: HEALTH CARE PROVIDER

## 2023-09-12 PROCEDURE — 6360000002 HC RX W HCPCS: Performed by: HEALTH CARE PROVIDER

## 2023-09-12 RX ORDER — KETOROLAC TROMETHAMINE 30 MG/ML
30 INJECTION, SOLUTION INTRAMUSCULAR; INTRAVENOUS ONCE
Status: COMPLETED | OUTPATIENT
Start: 2023-09-12 | End: 2023-09-12

## 2023-09-12 RX ORDER — ACETAMINOPHEN 500 MG
1000 TABLET ORAL ONCE
Status: COMPLETED | OUTPATIENT
Start: 2023-09-12 | End: 2023-09-12

## 2023-09-12 RX ADMIN — KETOROLAC TROMETHAMINE 30 MG: 30 INJECTION, SOLUTION INTRAMUSCULAR; INTRAVENOUS at 03:25

## 2023-09-12 RX ADMIN — ACETAMINOPHEN 1000 MG: 500 TABLET ORAL at 02:45

## 2023-09-12 ASSESSMENT — ENCOUNTER SYMPTOMS
DIARRHEA: 0
ABDOMINAL PAIN: 0
SORE THROAT: 0
NAUSEA: 0
SHORTNESS OF BREATH: 0
VOMITING: 0
CONSTIPATION: 0

## 2023-09-12 ASSESSMENT — PAIN DESCRIPTION - ORIENTATION: ORIENTATION: LEFT

## 2023-09-12 ASSESSMENT — PAIN SCALES - GENERAL: PAINLEVEL_OUTOF10: 10

## 2023-09-12 ASSESSMENT — PAIN - FUNCTIONAL ASSESSMENT: PAIN_FUNCTIONAL_ASSESSMENT: 0-10

## 2023-09-12 ASSESSMENT — PAIN DESCRIPTION - LOCATION: LOCATION: KNEE

## 2023-09-12 NOTE — ED TRIAGE NOTES
Pt to ed c/o left knee pain. Pt states he woke up and got up to go to the bathroom and felt a pop and pain in his left knee. Pt denies any injury. Pt denies taking anything for the pain. Pt states he cannot put weight on that leg. Pt is alert and oriented x4. To room via wheelchair. Vitals stable. Resting comfortably on cot, call light in reach. Will continue with plan of care.

## 2023-09-12 NOTE — DISCHARGE INSTRUCTIONS
Call today or tomorrow to follow up with your primary care provider in 2-3 days. If you do not have primary care writing follow-up with Erikaashley Mari  or [facility] with information provided for you here. Use an ice pack or bag filled with ice and apply to the injured area 3 - 4 times a day for 15 - 20 minutes each time. Use ibuprofen or Tylenol (unless prescribed medications that have Tylenol in it) for pain. You can take over the counter Ibuprofen (advil) tablets (4 every 8 hours or 3 every 6 hours or 2 every 4 hours)    Use your crutches for the next several days until you are able to take 10 steps without pain. Return to the Emergency Department for worsening of pain, swelling to the knee, inability to move your knee, unable to walk, any other care or concern.

## 2023-09-12 NOTE — ED PROVIDER NOTES
[address] [place] [address] ED  Emergency Department Encounter  Emergency Medicine Resident     Pt Joshua Salazar  MRN: [id number]  [address] [facility], [place], [facility] 1965  Date of evaluation: 9/12/23  PCP:  No primary care provider on file. Note Started: 2:25 AM EDT      CHIEF COMPLAINT       Chief Complaint   Patient presents with    Knee Pain       HISTORY OF PRESENT ILLNESS  (Location/Symptom, Timing/Onset, Context/Setting, Quality, Duration, Modifying Factors, Severity.)      Eli Ferrari is a 60 y.o. male who presents with concerns for knee pain. Patient states that he has otherwise been in good health, woke up early this morning to go to the bathroom and he felt a pop in his left knee. Has had some pain on the medial portion of the left knee. Minimal swelling noted. Denies any weakness numbness or paresthesias of the lower extremities. PAST MEDICAL / SURGICAL / SOCIAL / FAMILY HISTORY      has a past medical history of H. pylori infection and KENJI (obstructive sleep apnea).     has a past surgical history that includes Upper gastrointestinal endoscopy (08/23/2016).     Social History     Socioeconomic History    Marital status: Single     Spouse name: Not on file    Number of children: Not on file    Years of education: Not on file    Highest education level: Not on file   Occupational History    Not on file   Tobacco Use    Smoking status: Every Day     Packs/day: 0.50     Years: 20.00     Additional pack years: 0.00     Total pack years: 10.00     Types: Cigarettes    Smokeless tobacco: Never   Substance and Sexual Activity    Alcohol use: No    Drug use: No     Types: Cocaine     Comment: no drug use x 20 years    Sexual activity: Yes   Other Topics Concern    Not on file   Social History Narrative    Not on file     Social Determinants of Health     Financial Resource Strain: Not on file   Food Insecurity: Not on file   Transportation Needs: Not on file   Physical Activity: Not on file   Stress: Not on file

## 2023-09-14 ENCOUNTER — HOSPITAL ENCOUNTER (EMERGENCY)
Age: 58
Discharge: HOME | End: 2023-09-14
Attending: STUDENT IN AN ORGANIZED HEALTH CARE EDUCATION/TRAINING PROGRAM
Payer: MEDICAID

## 2023-09-14 VITALS
RESPIRATION RATE: 18 BRPM | HEART RATE: 64 BPM | OXYGEN SATURATION: 98 % | TEMPERATURE: 97.8 F | SYSTOLIC BLOOD PRESSURE: 136 MMHG | DIASTOLIC BLOOD PRESSURE: 79 MMHG

## 2023-09-14 DIAGNOSIS — M25.562 ACUTE PAIN OF LEFT KNEE: Primary | ICD-10-CM

## 2023-09-14 PROCEDURE — 99282 EMERGENCY DEPT VISIT SF MDM: CPT

## 2023-09-14 ASSESSMENT — ENCOUNTER SYMPTOMS
BACK PAIN: 0
NAUSEA: 0
ABDOMINAL PAIN: 0
VOMITING: 0

## 2023-09-14 ASSESSMENT — PAIN DESCRIPTION - LOCATION: LOCATION: KNEE

## 2023-09-14 ASSESSMENT — PAIN - FUNCTIONAL ASSESSMENT: PAIN_FUNCTIONAL_ASSESSMENT: 0-10

## 2023-09-14 ASSESSMENT — PAIN SCALES - GENERAL: PAINLEVEL_OUTOF10: 8

## 2023-09-14 ASSESSMENT — PAIN DESCRIPTION - FREQUENCY: FREQUENCY: CONTINUOUS

## 2023-09-14 ASSESSMENT — PAIN DESCRIPTION - DESCRIPTORS: DESCRIPTORS: DISCOMFORT

## 2023-09-14 ASSESSMENT — PAIN DESCRIPTION - ORIENTATION: ORIENTATION: LEFT

## 2023-09-14 NOTE — ED NOTES
Pt to ED with c/o L knee pain. Pt reports recently seen for same complaint stating he was given a knee brace but pain persists. Pt asked writer \"Do you have a fax machine? I need some papers faxed to court that I'm here because I'm missing it. \"      Belen Villela RN  09/14/23 0043

## 2023-09-14 NOTE — DISCHARGE INSTRUCTIONS
Use an ice pack or bag filled with ice and apply to the injured area 3 - 4 times a day for 15 - 20 minutes each time. Use ibuprofen or Tylenol (unless prescribed medications that have Tylenol in it) for pain. You can take over the counter Ibuprofen (advil) tablets (4 every 8 hours or 3 every 6 hours or 2 every 4 hours)    Use your crutches for the next several days until you are able to take 10 steps without pain. Return to the Emergency Department for worsening of pain, swelling to the knee, inability to move your knee, unable to walk, any other care or concern.

## 2023-09-14 NOTE — ED NOTES
Patient registered with the social security number given to nurse. Patient verified identity with 2 identifying factors.        Sahil Carr RN  09/14/23 1784

## 2023-09-14 NOTE — ED PROVIDER NOTES
[facility] ED  Emergency Department Encounter  Emergency Medicine Resident     Pt Arnulfo Bright  MRN: [id number]  [address] [facility] 1965  Date of evaluation: 9/14/23  PCP:  No primary care provider on file. Note Started: 9:46 AM EDT      CHIEF COMPLAINT       Chief Complaint   Patient presents with    Knee Pain       HISTORY OF PRESENT ILLNESS  (Location/Symptom, Timing/Onset, Context/Setting, Quality, Duration, Modifying Factors, Severity.)      Lazarus Berkeley is a 60 y.o. male who presents with left knee pain. Patient was seen here 2 days ago for the similar symptoms. At that time had x-ray of the left knee which was negative, patient was given knee brace and discharge. Patient is returning today as the pain has not improved. Patient denies any traumatic injury to the knee. Patient denies any symptoms including fever, chills, nausea, vomiting. Patient states he is not taking any medications for his symptoms. PAST MEDICAL / SURGICAL / SOCIAL / FAMILY HISTORY      has a past medical history of H. pylori infection and KENJI (obstructive sleep apnea).       has a past surgical history that includes Upper gastrointestinal endoscopy (08/23/2016).       Social History     Socioeconomic History    Marital status: Single     Spouse name: Not on file    Number of children: Not on file    Years of education: Not on file    Highest education level: Not on file   Occupational History    Not on file   Tobacco Use    Smoking status: Every Day     Packs/day: 0.50     Years: 20.00     Additional pack years: 0.00     Total pack years: 10.00     Types: Cigarettes    Smokeless tobacco: Never   Substance and Sexual Activity    Alcohol use: No    Drug use: No     Types: Cocaine     Comment: no drug use x 20 years    Sexual activity: Yes   Other Topics Concern    Not on file   Social History Narrative    Not on file     Social Determinants of Health     Financial Resource Strain: Not on file   Food Insecurity:

## 2024-01-09 ENCOUNTER — HOSPITAL ENCOUNTER (EMERGENCY)
Age: 59
Discharge: HOME | End: 2024-01-09
Attending: EMERGENCY MEDICINE
Payer: MEDICAID

## 2024-01-09 ENCOUNTER — APPOINTMENT (OUTPATIENT)
Dept: GENERAL RADIOLOGY | Age: 59
End: 2024-01-09
Payer: MEDICAID

## 2024-01-09 VITALS
HEIGHT: 73 IN | WEIGHT: 170 LBS | BODY MASS INDEX: 22.53 KG/M2 | SYSTOLIC BLOOD PRESSURE: 116 MMHG | OXYGEN SATURATION: 99 % | DIASTOLIC BLOOD PRESSURE: 67 MMHG | TEMPERATURE: 97 F | RESPIRATION RATE: 18 BRPM | HEART RATE: 63 BPM

## 2024-01-09 DIAGNOSIS — J40 BRONCHITIS: ICD-10-CM

## 2024-01-09 DIAGNOSIS — M46.1 SI (SACROILIAC) JOINT INFLAMMATION (HCC): Primary | ICD-10-CM

## 2024-01-09 PROCEDURE — 6370000000 HC RX 637 (ALT 250 FOR IP)

## 2024-01-09 PROCEDURE — 71046 X-RAY EXAM CHEST 2 VIEWS: CPT

## 2024-01-09 PROCEDURE — 99283 EMERGENCY DEPT VISIT LOW MDM: CPT

## 2024-01-09 RX ORDER — PREDNISONE 50 MG/1
50 TABLET ORAL DAILY
Qty: 5 TABLET | Refills: 0 | Status: SHIPPED | OUTPATIENT
Start: 2024-01-09 | End: 2024-01-14

## 2024-01-09 RX ORDER — IBUPROFEN 800 MG/1
800 TABLET ORAL ONCE
Status: COMPLETED | OUTPATIENT
Start: 2024-01-09 | End: 2024-01-09

## 2024-01-09 RX ORDER — IBUPROFEN 600 MG/1
600 TABLET ORAL 4 TIMES DAILY PRN
Qty: 30 TABLET | Refills: 0 | Status: SHIPPED | OUTPATIENT
Start: 2024-01-09

## 2024-01-09 RX ORDER — ACETAMINOPHEN 500 MG
500 TABLET ORAL 4 TIMES DAILY PRN
Qty: 30 TABLET | Refills: 0 | Status: SHIPPED | OUTPATIENT
Start: 2024-01-09

## 2024-01-09 RX ORDER — ACETAMINOPHEN 325 MG/1
650 TABLET ORAL ONCE
Status: COMPLETED | OUTPATIENT
Start: 2024-01-09 | End: 2024-01-09

## 2024-01-09 RX ORDER — LIDOCAINE 4 G/G
1 PATCH TOPICAL DAILY
Status: DISCONTINUED | OUTPATIENT
Start: 2024-01-09 | End: 2024-01-09 | Stop reason: HOSPADM

## 2024-01-09 RX ADMIN — ACETAMINOPHEN 650 MG: 325 TABLET ORAL at 16:38

## 2024-01-09 RX ADMIN — IBUPROFEN 800 MG: 800 TABLET, FILM COATED ORAL at 16:38

## 2024-01-09 ASSESSMENT — PAIN - FUNCTIONAL ASSESSMENT: PAIN_FUNCTIONAL_ASSESSMENT: 0-10

## 2024-01-09 ASSESSMENT — PAIN DESCRIPTION - LOCATION: LOCATION: BACK

## 2024-01-09 ASSESSMENT — PAIN DESCRIPTION - ORIENTATION: ORIENTATION: LOWER

## 2024-01-09 ASSESSMENT — PAIN SCALES - GENERAL
PAINLEVEL_OUTOF10: 10
PAINLEVEL_OUTOF10: 10

## 2024-01-09 NOTE — ED PROVIDER NOTES
[facility] ED  Emergency Department Encounter  Emergency Medicine Resident     Pt Name:Chevy Galeano  MRN: [id number]  Birthdate 1965  Date of evaluation: 1/9/24  PCP:  No primary care provider on file.  Note Started: 4:16 PM EST      CHIEF COMPLAINT       Chief Complaint   Patient presents with    Back Pain     Denies any injury; started last night    Cough     X6 months       HISTORY OF PRESENT ILLNESS  (Location/Symptom, Timing/Onset, Context/Setting, Quality, Duration, Modifying Factors, Severity.)      Cheyv Galeano is a 58 y.o. male who presents with low back pain and productive cough.  Patient reports slow onset low back pain yesterday that is now reduced intensity 8/10, nagging, bilateral low back without any radiation or associated numbness, tingling, weakness.  Patient denies any difficulty urinating or incontinence.  Denies any numbness between his legs.  Denies any recent fever or chills.  Denies any IV drug use or history of cancer.  Denies any trauma or falls, but does state he was lifting some very heavy furniture a couple weeks ago when he was helping his friend move.  Patient is also reporting a chronic productive cough for the past 6 months without nasal congestion, sore throat.  Patient denies any recent travel to other countries or exposure to people who are coming from endemic areas.  Patient does have history of blood clots, is not on anticoagulation, but denies any blood in his sputum.  Denies any lower extremity swelling or calf pain.    PAST MEDICAL / SURGICAL / SOCIAL / FAMILY HISTORY      has a past medical history of H. pylori infection and KENJI (obstructive sleep apnea).       has a past surgical history that includes Upper gastrointestinal endoscopy (08/23/2016).      Social History     Socioeconomic History    Marital status: Single     Spouse name: Not on file    Number of children: Not on file    Years of education: Not on file    Highest education level: Not on

## 2024-01-09 NOTE — ED TRIAGE NOTES
59 yo male arrived to ED with c/o right sided back pain which started yesterday. Patient denies any numbness/tingling. Patient denies any falls and/or injuries

## 2024-01-09 NOTE — DISCHARGE INSTRUCTIONS
You are started on a course of steroids which will help your cough and back pain.  You should take this until it is completely gone.  You can otherwise use Tylenol and Motrin as needed for your back pain.  You should follow-up with your primary care provider or call the [facility] to be seen for ongoing management of your medical needs.    If you begin to experience worsening symptoms, please turn the emergency department for further evaluation

## 2024-01-09 NOTE — ED PROVIDER NOTES
Note Started: 5:27 PM ABDI         [facility]     Emergency Department     Faculty Attestation    I performed a history and physical examination of the patient and discussed management with the resident. I reviewed the resident’s note and agree with the documented findings and plan of care. Any areas of disagreement are noted on the chart. I was personally present for the key portions of any procedures. I have documented in the chart those procedures where I was not present during the key portions. I have reviewed the emergency nurses triage note. I agree with the chief complaint, past medical history, past surgical history, allergies, medications, social and family history as documented unless otherwise noted below.        For Physician Assistant/ Nurse Practitioner cases/documentation I have personally evaluated this patient and have completed at least one if not all key elements of the E/M (history, physical exam, and MDM). Additional findings are as noted.  I have personally seen and evaluated the patient.  I find the patient's history and physical exam are consistent with the NP/PA documentation.  I agree with the care provided, treatment rendered, disposition and follow-up plan.    Back pain after lifting several days ago. No pain initially. Pain goes back and forth over the low back, worse with movement. No numbness, weakness or bowel/bladder changes. Also chronic cough (6 months). Smoker, no shortness of breath.    Exam:  General : Laying on the bed, awake, alert, and in no acute distress  CV : normal rate and regular rhythm  Lungs : Breathing comfortably on room air with no tachypnea, hypoxia, or increased work of breathing  Neuro: Awake, alert. Moving all extremities. No numbness in the lower extremities. Normal sensation to light touch and temperature in the bilateral LE. Normal strength of dorsiflexors and plantarflexors.    DDx:Low back pain, likely muscular. No signs of spinal cord

## 2024-07-11 ENCOUNTER — APPOINTMENT (OUTPATIENT)
Dept: CT IMAGING | Age: 59
End: 2024-07-11
Payer: COMMERCIAL

## 2024-07-11 ENCOUNTER — APPOINTMENT (OUTPATIENT)
Dept: GENERAL RADIOLOGY | Age: 59
End: 2024-07-11
Payer: COMMERCIAL

## 2024-07-11 ENCOUNTER — HOSPITAL ENCOUNTER (EMERGENCY)
Age: 59
Discharge: HOME | End: 2024-07-11
Attending: EMERGENCY MEDICINE
Payer: COMMERCIAL

## 2024-07-11 VITALS
BODY MASS INDEX: 21.87 KG/M2 | WEIGHT: 165 LBS | SYSTOLIC BLOOD PRESSURE: 131 MMHG | OXYGEN SATURATION: 98 % | DIASTOLIC BLOOD PRESSURE: 98 MMHG | HEART RATE: 67 BPM | RESPIRATION RATE: 19 BRPM | TEMPERATURE: 98.7 F | HEIGHT: 73 IN

## 2024-07-11 DIAGNOSIS — M17.12 ARTHRITIS OF LEFT KNEE: Primary | ICD-10-CM

## 2024-07-11 LAB
ANION GAP SERPL CALCULATED.3IONS-SCNC: 11 MMOL/L (ref 9–16)
BASOPHILS # BLD: 0.06 K/UL (ref 0–0.2)
BASOPHILS NFR BLD: 1 % (ref 0–2)
BUN SERPL-MCNC: 16 MG/DL (ref 8–23)
CALCIUM SERPL-MCNC: 8.9 MG/DL (ref 8.6–10.4)
CHLORIDE SERPL-SCNC: 105 MMOL/L (ref 98–107)
CO2 SERPL-SCNC: 23 MMOL/L (ref 20–31)
CREAT SERPL-MCNC: 1.5 MG/DL (ref 0.7–1.2)
CRP SERPL HS-MCNC: <3 MG/L (ref 0–5)
EOSINOPHIL # BLD: 0.2 K/UL (ref 0–0.44)
EOSINOPHILS RELATIVE PERCENT: 3 % (ref 1–4)
ERYTHROCYTE [DISTWIDTH] IN BLOOD BY AUTOMATED COUNT: 12.5 % (ref 11.8–14.4)
ERYTHROCYTE [SEDIMENTATION RATE] IN BLOOD BY PHOTOMETRIC METHOD: 5 MM/HR (ref 0–20)
GFR, ESTIMATED: 55 ML/MIN/1.73M2
GLUCOSE SERPL-MCNC: 109 MG/DL (ref 74–99)
HCT VFR BLD AUTO: 41 % (ref 40.7–50.3)
HGB BLD-MCNC: 13 G/DL (ref 13–17)
IMM GRANULOCYTES # BLD AUTO: <0.03 K/UL (ref 0–0.3)
IMM GRANULOCYTES NFR BLD: 0 %
LYMPHOCYTES NFR BLD: 1.62 K/UL (ref 1.1–3.7)
LYMPHOCYTES RELATIVE PERCENT: 25 % (ref 24–43)
MCH RBC QN AUTO: 31.6 PG (ref 25.2–33.5)
MCHC RBC AUTO-ENTMCNC: 31.7 G/DL (ref 28.4–34.8)
MCV RBC AUTO: 99.8 FL (ref 82.6–102.9)
MONOCYTES NFR BLD: 0.48 K/UL (ref 0.1–1.2)
MONOCYTES NFR BLD: 7 % (ref 3–12)
NEUTROPHILS NFR BLD: 64 % (ref 36–65)
NEUTS SEG NFR BLD: 4.2 K/UL (ref 1.5–8.1)
NRBC BLD-RTO: 0 PER 100 WBC
PLATELET # BLD AUTO: 240 K/UL (ref 138–453)
PMV BLD AUTO: 9.4 FL (ref 8.1–13.5)
POTASSIUM SERPL-SCNC: 3.7 MMOL/L (ref 3.7–5.3)
RBC # BLD AUTO: 4.11 M/UL (ref 4.21–5.77)
SODIUM SERPL-SCNC: 139 MMOL/L (ref 136–145)
TROPONIN I SERPL HS-MCNC: 13 NG/L (ref 0–22)
TROPONIN I SERPL HS-MCNC: 13 NG/L (ref 0–22)
WBC OTHER # BLD: 6.6 K/UL (ref 3.5–11.3)

## 2024-07-11 PROCEDURE — 85025 COMPLETE CBC W/AUTO DIFF WBC: CPT

## 2024-07-11 PROCEDURE — 85652 RBC SED RATE AUTOMATED: CPT

## 2024-07-11 PROCEDURE — 6360000004 HC RX CONTRAST MEDICATION

## 2024-07-11 PROCEDURE — 71260 CT THORAX DX C+: CPT

## 2024-07-11 PROCEDURE — 84484 ASSAY OF TROPONIN QUANT: CPT

## 2024-07-11 PROCEDURE — 86140 C-REACTIVE PROTEIN: CPT

## 2024-07-11 PROCEDURE — 80048 BASIC METABOLIC PNL TOTAL CA: CPT

## 2024-07-11 PROCEDURE — 6370000000 HC RX 637 (ALT 250 FOR IP)

## 2024-07-11 PROCEDURE — 99285 EMERGENCY DEPT VISIT HI MDM: CPT | Performed by: EMERGENCY MEDICINE

## 2024-07-11 PROCEDURE — 73562 X-RAY EXAM OF KNEE 3: CPT

## 2024-07-11 RX ADMIN — APIXABAN 10 MG: 5 TABLET, FILM COATED ORAL at 23:26

## 2024-07-11 RX ADMIN — IOPAMIDOL 75 ML: 755 INJECTION, SOLUTION INTRAVENOUS at 22:02

## 2024-07-11 ASSESSMENT — PAIN - FUNCTIONAL ASSESSMENT: PAIN_FUNCTIONAL_ASSESSMENT: 0-10

## 2024-07-11 ASSESSMENT — PAIN SCALES - GENERAL: PAINLEVEL_OUTOF10: 10

## 2024-07-11 ASSESSMENT — PAIN DESCRIPTION - LOCATION: LOCATION: KNEE

## 2024-07-11 ASSESSMENT — PAIN DESCRIPTION - ORIENTATION: ORIENTATION: LEFT

## 2024-07-12 LAB
EKG ATRIAL RATE: 72 BPM
EKG P AXIS: 78 DEGREES
EKG P-R INTERVAL: 222 MS
EKG Q-T INTERVAL: 394 MS
EKG QRS DURATION: 98 MS
EKG QTC CALCULATION (BAZETT): 431 MS
EKG R AXIS: 82 DEGREES
EKG T AXIS: 72 DEGREES
EKG VENTRICULAR RATE: 72 BPM

## 2024-07-12 NOTE — ED TRIAGE NOTES
Pt presents to ED with complaints of left knee pain and swelling that has been going on for 4 days as per pt.   Pt denies any injury to it.  Pt also complains of chronic cough he has been having for 2 years, states he's been coughing out greenish phlegm.  Pt is a a smoker, denies blood when he cough.   Pt also reports he used to be on Eliquis for a blood clot in his lung somewhat last year, pt is unsure.  Pt denies chest pain.  Pt hooked to monitor, call light given.

## 2024-07-12 NOTE — ED PROVIDER NOTES
[facility] ED     Emergency Department     Faculty Attestation        I performed a history and physical examination of the patient and discussed management with the resident. I reviewed the resident’s note and agree with the documented findings and plan of care. Any areas of disagreement are noted on the chart. I was personally present for the key portions of any procedures. I have documented in the chart those procedures where I was not present during the key portions. I have reviewed the emergency nurses triage note. I agree with the chief complaint, past medical history, past surgical history, allergies, medications, social and family history as documented unless otherwise noted below.    For mid-level providers such as nurse practitioners as well as physicians assistants:    I have personally seen and evaluated the patient.    I find the patient's history and physical exam are consistent with NP/PA documentation.  I agree with the care provided, treatment rendered, disposition, & follow-up plan.     Additional findings are as noted.    Vital Signs: BP (!) 131/98   Pulse 77   Temp 98.7 °F (37.1 °C) (Oral)   Resp 16   Ht 1.854 m (6' 1\")   Wt 74.8 kg (165 lb)   SpO2 98%   BMI 21.77 kg/m²   PCP:  No primary care provider on file.    Pertinent Comments:           Critical Care  None          Scooter Garcia MD    Attending Emergency Medicine Physician            Armand Garcia MD  07/11/24 2057    
     [facility] ED  Emergency Department  Emergency Medicine Sign-out   Care of Chevy Galeano was assumed from Dr. Estrada and is being seen for Knee Pain (Left knee pain and swelling x 3 days) and Cough (Chronic cough for years)  .  The patient's initial evaluation and plan have been discussed with the prior provider who initially evaluated the patient.     EMERGENCY DEPARTMENT COURSE / MEDICAL DECISION MAKING:       MEDICATIONS GIVEN:  Orders Placed This Encounter   Medications    iopamidol (ISOVUE-370) 76 % injection 75 mL    apixaban (ELIQUIS) tablet 10 mg     Order Specific Question:   Indication of Use     Answer:   Treatment-DVT/PE    apixaban (ELIQUIS) 5 MG TABS tablet     Sig: Take 2 tablets by mouth 2 times daily for 7 days     Dispense:  28 tablet     Refill:  0       LABS / RADIOLOGY:     Labs Reviewed   CBC WITH AUTO DIFFERENTIAL - Abnormal; Notable for the following components:       Result Value    RBC 4.11 (*)     All other components within normal limits   BASIC METABOLIC PANEL - Abnormal; Notable for the following components:    Glucose 109 (*)     Creatinine 1.5 (*)     Est, Glom Filt Rate 55 (*)     All other components within normal limits   TROPONIN   TROPONIN   C-REACTIVE PROTEIN   SEDIMENTATION RATE       XR KNEE LEFT (3 VIEWS)    Result Date: 7/11/2024  EXAMINATION: THREE XRAY VIEWS OF THE LEFT KNEE 7/11/2024 9:21 pm COMPARISON: September 12, 2023 HISTORY: ORDERING SYSTEM PROVIDED HISTORY: swelling, pain TECHNOLOGIST PROVIDED HISTORY: swelling, pain Reason for Exam: chronic knee pain and swelling FINDINGS: Significant knee effusion is noted.  No acute fractures or dislocations. Patella and tibial plateau appear intact.  No obvious acute traumatic injury. Mild tricompartmental degenerative changes.     Significant knee effusion. No acute fractures or dislocations.  Mild tricompartmental degenerative changes.       RECENT VITALS:     Temp: 98.7 °F (37.1 °C),  Pulse: 67, 
  Fri Jul 12, 2024   1436 Patient signed out to nighttime resident.  No completed at later date due to downtime of epic at end of shift [AK]      ED Course User Index  [AK] Niles Estrada MD  [GI] Casimiro Nino DO       PROCEDURES:      CONSULTS:  None    CRITICAL CARE:  There was significant risk of life threatening deterioration of patient's condition requiring my direct management. Critical care time  minutes, excluding any documented procedures.    FINAL IMPRESSION      1. Arthritis of left knee          DISPOSITION / PLAN     DISPOSITION Decision To Discharge 07/11/2024 11:24:30 PM      PATIENT REFERRED TO:  Isaiah Camacho MD  [address]  [facility] [id number] [phone]    Call in 2 days  For follow-up for your left knee pain    Jessica Peña MD  [address] [facility]  [id number]  [facility] [id number] [phone]    Call in 2 days  For follow-up for your blood clot in your lung    [facility]  [address] [place] [id number] [phone]  Call in 2 days  To establish care      DISCHARGE MEDICATIONS:  Discharge Medication List as of 7/11/2024 11:35 PM        START taking these medications    Details   apixaban (ELIQUIS) 5 MG TABS tablet Take 2 tablets by mouth 2 times daily for 7 days, Disp-28 tablet, R-0Normal             Niles Estrada MD  Emergency Medicine Resident    (Please note that portions of this note were completed with a voice recognition program.  Efforts were made to edit the dictations but occasionally words are mis-transcribed.)

## 2024-07-12 NOTE — DISCHARGE INSTRUCTIONS
You are seen in the emergency department for your left knee pain.  Believe that you have arthritis.  Please follow-up with an orthopedic surgeon.      You are previously diagnosed with a blood clot in your lungs.  The CT scan of your chest did not show a blood clot however you need to be on further anticoagulation.  Typically which for 6 months.  It is very important a follow-up with vascular surgery.    Take your medication as indicated.  For pain use ibuprofen (Motrin / Advil) or acetaminophen (Tylenol), unless prescribed medications that have acetaminophen in it.  You can take over the counter acetaminophen tablets (1 - 2 tablets of the 500-mg strength every 6 hours) or ibuprofen tablets (2 tablets every 4 hours).    PLEASE RETURN TO THE EMERGENCY DEPARTMENT IMMEDIATELY for worsening symptoms of increasing pain, shortness of breath, feeling of your heart fluttering or racing, swelling to your feet, unable to lay flat, or if you develop any concerning symptoms such as: high fever not relieved by acetaminophen (Tylenol) and/or ibuprofen (Motrin / Advil), chills, persistent nausea and/or vomiting, loss of consciousness, numbness, weakness or tingling in the arms or legs or change in color of the extremities, changes in mental status, persistent headache, blurry vision, loss of bladder / bowel control, unable to follow up with your physician, or other any other care or concern.

## 2024-07-17 ENCOUNTER — OFFICE VISIT (OUTPATIENT)
Dept: ORTHOPEDIC SURGERY | Age: 59
End: 2024-07-17
Payer: COMMERCIAL

## 2024-07-17 VITALS — HEIGHT: 73 IN | WEIGHT: 165 LBS | BODY MASS INDEX: 21.87 KG/M2

## 2024-07-17 DIAGNOSIS — M17.12 OSTEOARTHRITIS OF LEFT KNEE, UNSPECIFIED OSTEOARTHRITIS TYPE: ICD-10-CM

## 2024-07-17 DIAGNOSIS — M25.562 LEFT KNEE PAIN, UNSPECIFIED CHRONICITY: Primary | ICD-10-CM

## 2024-07-17 PROCEDURE — 99203 OFFICE O/P NEW LOW 30 MIN: CPT

## 2024-07-17 PROCEDURE — 20610 DRAIN/INJ JOINT/BURSA W/O US: CPT

## 2024-07-17 RX ORDER — BUPIVACAINE HYDROCHLORIDE 2.5 MG/ML
2 INJECTION, SOLUTION INFILTRATION; PERINEURAL ONCE
Status: COMPLETED | OUTPATIENT
Start: 2024-07-17 | End: 2024-07-17

## 2024-07-17 RX ORDER — METHYLPREDNISOLONE ACETATE 80 MG/ML
80 INJECTION, SUSPENSION INTRA-ARTICULAR; INTRALESIONAL; INTRAMUSCULAR; SOFT TISSUE ONCE
Status: COMPLETED | OUTPATIENT
Start: 2024-07-17 | End: 2024-07-17

## 2024-07-17 RX ADMIN — BUPIVACAINE HYDROCHLORIDE 2 ML: 2.5 INJECTION, SOLUTION INFILTRATION; PERINEURAL at 11:44

## 2024-07-17 RX ADMIN — METHYLPREDNISOLONE ACETATE 80 MG: 80 INJECTION, SUSPENSION INTRA-ARTICULAR; INTRALESIONAL; INTRAMUSCULAR; SOFT TISSUE at 11:44

## 2024-07-17 NOTE — PROGRESS NOTES
[facility] [place] [facility]  [address]  [facility] [address]  Dept: [phone]    Ambulatory Orthopedic Office Visit      CHIEF COMPLAINT:    Chief Complaint   Patient presents with    Knee Pain     LT knee ED follow up 7/11/2024       HISTORY OF PRESENT ILLNESS:      The patient is a 59 y.o.male who is being seen as a new patient for left knee pain.  Patient states he has had pain for several years and has been increasing in intensity.  He denies specific injury or mechanism.  He has tried oral anti-inflammatory medications with some improvement of his symptoms.  His knee does feel unstable however he has not fallen to the ground.  He has not had formal physical therapy.  He denies prior intra-articular corticosteroid injection.  Denies prior surgeries or significant injury.  He works as a high school .      Past Medical History:    Past Medical History:   Diagnosis Date    H. pylori infection     KENJI (obstructive sleep apnea) 6/14/2012       Past Surgical History:    Past Surgical History:   Procedure Laterality Date    UPPER GASTROINTESTINAL ENDOSCOPY  08/23/2016       Current Medications:   Current Outpatient Medications   Medication Sig Dispense Refill    diclofenac (VOLTAREN) 50 MG EC tablet Take 1 tablet by mouth 2 times daily 60 tablet 3    apixaban (ELIQUIS) 5 MG TABS tablet Take 2 tablets by mouth 2 times daily for 7 days 28 tablet 0    acetaminophen (TYLENOL) 500 MG tablet Take 1 tablet by mouth 4 times daily as needed for Pain 30 tablet 0     No current facility-administered medications for this visit.       Allergies:    Patient has no known allergies.    Social History:   Social History     Socioeconomic History    Marital status: Single     Spouse name: Not on file    Number of children: Not on file    Years of education: Not on file    Highest education level: Not on file   Occupational History    Not on file

## 2024-07-25 ENCOUNTER — INITIAL CONSULT (OUTPATIENT)
Dept: VASCULAR SURGERY | Age: 59
End: 2024-07-25
Payer: COMMERCIAL

## 2024-07-25 VITALS
BODY MASS INDEX: 21.6 KG/M2 | OXYGEN SATURATION: 96 % | HEART RATE: 75 BPM | WEIGHT: 163 LBS | HEIGHT: 73 IN | RESPIRATION RATE: 18 BRPM | DIASTOLIC BLOOD PRESSURE: 75 MMHG | SYSTOLIC BLOOD PRESSURE: 111 MMHG

## 2024-07-25 DIAGNOSIS — R05.3 CHRONIC COUGH: Primary | ICD-10-CM

## 2024-07-25 DIAGNOSIS — I26.93 SINGLE SUBSEGMENTAL PULMONARY EMBOLISM WITHOUT ACUTE COR PULMONALE (HCC): Primary | ICD-10-CM

## 2024-07-25 PROCEDURE — 99214 OFFICE O/P EST MOD 30 MIN: CPT | Performed by: SURGERY

## 2024-07-25 NOTE — PROGRESS NOTES
Palpations: Abdomen is soft.      Tenderness: There is no abdominal tenderness.   Musculoskeletal:      Cervical back: Full passive range of motion without pain.      Right lower leg: No swelling or tenderness. No edema.      Left lower leg: No swelling or tenderness. No edema.      Right foot: Normal capillary refill. No swelling or tenderness.      Left foot: Normal capillary refill. No swelling or tenderness.   Feet:      Right foot:      Skin integrity: No ulcer or skin breakdown.      Left foot:      Skin integrity: No ulcer or skin breakdown.   Skin:     General: Skin is warm.      Capillary Refill: Capillary refill takes less than 2 seconds.   Neurological:      Mental Status: He is alert and oriented to person, place, and time.      GCS: GCS eye subscore is 4. GCS verbal subscore is 5. GCS motor subscore is 6.      Sensory: Sensation is intact.      Motor: Motor function is intact.   Psychiatric:         Mood and Affect: Mood normal.         Speech: Speech normal.         Behavior: Behavior normal.         Thought Content: Thought content normal.       Imaging/Labs:     CTA chest reviewed from January and July 2024 and I do not see any large pulmonary embolism, radiologist read from January suggest small segmental pulmonary embolism in right upper lobar branch but there is some motion artifact    Venous duplex from January 2024 does not reveal DVT    Assessment and Plan:     Small segmental pulmonary embolism, chronic productive cough  DO not recommend anticoagulation  Pulmonary embolism is very small if that even when noted on CT from January and not present on recent CTA in July  Recommend pulmonary function testing and referral to pulmonology for chronic cough  He will follow up with me as needed    Electronically signed by Jessica Peña MD on 7/25/24 at 2:36 PM EDT      [facility]  Office: [phone]  Cell: [phone]  Email: [email or web address]

## 2024-07-29 ENCOUNTER — HOSPITAL ENCOUNTER (OUTPATIENT)
Dept: PHYSICAL THERAPY | Age: 59
Setting detail: THERAPIES SERIES
Discharge: HOME | End: 2024-07-29
Payer: COMMERCIAL

## 2024-07-29 PROCEDURE — 97161 PT EVAL LOW COMPLEX 20 MIN: CPT

## 2024-07-29 PROCEDURE — 97162 PT EVAL MOD COMPLEX 30 MIN: CPT

## 2024-07-29 NOTE — CONSULTS
understanding.  [x] Demonstrates understanding.  [] Needs Review.  [] Demonstrates/verbalizes understanding of HEP/Ed previously given.    Treatment Plan:  [x] Therapeutic Exercise   04567  [] Iontophoresis: 4 mg/mL Dexamethasone Sodium Phosphate  mAmin  01169   [x] Therapeutic Activity  95582 [x] Vasopneumatic cold with compression  45623    [] Gait Training   90129 [] Ultrasound   03703   [x] Neuromuscular Re-education  78275 [] Electrical Stimulation Unattended  54697   [x] Manual Therapy  54100 [] Electrical Stimulation Attended  74126   [x] Instruction in HEP  [] Lumbar/Cervical Traction  71765   [] Aquatic Therapy   56779 [x] Cold/hotpack    [] Massage   78321      [] Dry Needling, 1 or 2 muscles  93277   [] Biofeedback, first 15 minutes   90912  [] Biofeedback, additional 15 minutes   90913 [] Dry Needling, 3 or more muscles  86869     []  Medication allergies reviewed for use of    Dexamethasone Sodium Phosphate 4mg/ml     with iontophoresis treatments.   Pt is not allergic.    Frequency:  2 x/week for 10 visits      Today’s Treatment:  Modalities:   Precautions:    Exercises: HANDOUT ONLY   Access Code: [email or web address]  URL: [email or web address]  Date: 07/29/2024  Prepared by: Acosta Zarate    Exercises  - Supine Quad Set  - 1 x daily - 7 x weekly - 3 sets - 10 reps  - Supine Heel Slide  - 1 x daily - 7 x weekly - 3 sets - 10 reps  - Seated Long Arc Quad  - 1 x daily - 7 x weekly - 3 sets - 10 reps    Specific Instructions for next treatment: L knee ROM, L knee strengthening, Edema control     Evaluation Complexity:  History (Personal factors, comorbidities) [] 0 [x] 1-2 [] 3+   Exam (limitations, restrictions) [] 1-2 [x] 3 [] 4+   Clinical presentation (progression) [] Stable [x] Evolving  [] Unstable   Decision Making [x] Low [] Moderate [] High    [x] Low Complexity [] Moderate Complexity [] High Complexity       Treatment Charges: Mins Units   [x] Evaluation       [x]  Low       []  Moderate

## 2024-08-06 ENCOUNTER — HOSPITAL ENCOUNTER (OUTPATIENT)
Age: 59
Setting detail: SPECIMEN
Discharge: HOME | End: 2024-08-06

## 2024-08-06 ENCOUNTER — OFFICE VISIT (OUTPATIENT)
Dept: FAMILY MEDICINE CLINIC | Age: 59
End: 2024-08-06
Payer: COMMERCIAL

## 2024-08-06 VITALS
HEIGHT: 73 IN | BODY MASS INDEX: 21.26 KG/M2 | SYSTOLIC BLOOD PRESSURE: 122 MMHG | HEART RATE: 68 BPM | WEIGHT: 160.4 LBS | DIASTOLIC BLOOD PRESSURE: 77 MMHG

## 2024-08-06 DIAGNOSIS — R05.3 CHRONIC COUGH: Primary | ICD-10-CM

## 2024-08-06 DIAGNOSIS — J30.89 NON-SEASONAL ALLERGIC RHINITIS, UNSPECIFIED TRIGGER: ICD-10-CM

## 2024-08-06 DIAGNOSIS — R05.3 CHRONIC COUGH: ICD-10-CM

## 2024-08-06 DIAGNOSIS — M17.12 PRIMARY OSTEOARTHRITIS OF LEFT KNEE: ICD-10-CM

## 2024-08-06 DIAGNOSIS — Z72.0 NICOTINE ABUSE: ICD-10-CM

## 2024-08-06 LAB
ANION GAP SERPL CALCULATED.3IONS-SCNC: 9 MMOL/L (ref 9–16)
BASOPHILS # BLD: 0.05 K/UL (ref 0–0.2)
BASOPHILS NFR BLD: 1 % (ref 0–2)
BUN SERPL-MCNC: 18 MG/DL (ref 8–23)
CALCIUM SERPL-MCNC: 9.8 MG/DL (ref 8.6–10.4)
CHLORIDE SERPL-SCNC: 101 MMOL/L (ref 98–107)
CO2 SERPL-SCNC: 28 MMOL/L (ref 20–31)
CREAT SERPL-MCNC: 1.4 MG/DL (ref 0.7–1.2)
CRP SERPL HS-MCNC: <3 MG/L (ref 0–5)
EOSINOPHIL # BLD: 0.21 K/UL (ref 0–0.44)
EOSINOPHILS RELATIVE PERCENT: 3 % (ref 1–4)
ERYTHROCYTE [DISTWIDTH] IN BLOOD BY AUTOMATED COUNT: 12.8 % (ref 11.8–14.4)
ERYTHROCYTE [SEDIMENTATION RATE] IN BLOOD BY PHOTOMETRIC METHOD: 4 MM/HR (ref 0–20)
GFR, ESTIMATED: 60 ML/MIN/1.73M2
GLUCOSE SERPL-MCNC: 90 MG/DL (ref 74–99)
HCT VFR BLD AUTO: 46.1 % (ref 40.7–50.3)
HGB BLD-MCNC: 14.5 G/DL (ref 13–17)
IMM GRANULOCYTES # BLD AUTO: <0.03 K/UL (ref 0–0.3)
IMM GRANULOCYTES NFR BLD: 0 %
LYMPHOCYTES NFR BLD: 1.39 K/UL (ref 1.1–3.7)
LYMPHOCYTES RELATIVE PERCENT: 17 % (ref 24–43)
MCH RBC QN AUTO: 31.7 PG (ref 25.2–33.5)
MCHC RBC AUTO-ENTMCNC: 31.5 G/DL (ref 28.4–34.8)
MCV RBC AUTO: 100.7 FL (ref 82.6–102.9)
MONOCYTES NFR BLD: 0.54 K/UL (ref 0.1–1.2)
MONOCYTES NFR BLD: 7 % (ref 3–12)
NEUTROPHILS NFR BLD: 72 % (ref 36–65)
NEUTS SEG NFR BLD: 5.8 K/UL (ref 1.5–8.1)
NRBC BLD-RTO: 0 PER 100 WBC
PLATELET # BLD AUTO: 242 K/UL (ref 138–453)
PMV BLD AUTO: 10.4 FL (ref 8.1–13.5)
POTASSIUM SERPL-SCNC: 4.7 MMOL/L (ref 3.7–5.3)
RBC # BLD AUTO: 4.58 M/UL (ref 4.21–5.77)
SODIUM SERPL-SCNC: 138 MMOL/L (ref 136–145)
WBC OTHER # BLD: 8 K/UL (ref 3.5–11.3)

## 2024-08-06 PROCEDURE — 99213 OFFICE O/P EST LOW 20 MIN: CPT

## 2024-08-06 RX ORDER — LORATADINE 10 MG/1
10 TABLET ORAL DAILY
Qty: 40 TABLET | Refills: 0 | Status: SHIPPED | OUTPATIENT
Start: 2024-08-06

## 2024-08-06 RX ORDER — VARENICLINE TARTRATE 1 MG/1
1 TABLET, FILM COATED ORAL 2 TIMES DAILY
Qty: 60 TABLET | Refills: 5 | Status: SHIPPED | OUTPATIENT
Start: 2024-08-06

## 2024-08-06 RX ORDER — ALBUTEROL SULFATE 90 UG/1
2 AEROSOL, METERED RESPIRATORY (INHALATION) 4 TIMES DAILY PRN
Qty: 54 G | Refills: 1 | Status: SHIPPED | OUTPATIENT
Start: 2024-08-06

## 2024-08-06 RX ORDER — PREDNISOLONE ACETATE 10 MG/ML
1 SUSPENSION/ DROPS OPHTHALMIC 3 TIMES DAILY
COMMUNITY
Start: 2024-08-01 | End: 2025-08-01

## 2024-08-06 SDOH — ECONOMIC STABILITY: FOOD INSECURITY: WITHIN THE PAST 12 MONTHS, THE FOOD YOU BOUGHT JUST DIDN'T LAST AND YOU DIDN'T HAVE MONEY TO GET MORE.: NEVER TRUE

## 2024-08-06 SDOH — ECONOMIC STABILITY: INCOME INSECURITY: HOW HARD IS IT FOR YOU TO PAY FOR THE VERY BASICS LIKE FOOD, HOUSING, MEDICAL CARE, AND HEATING?: HARD

## 2024-08-06 SDOH — ECONOMIC STABILITY: FOOD INSECURITY: WITHIN THE PAST 12 MONTHS, YOU WORRIED THAT YOUR FOOD WOULD RUN OUT BEFORE YOU GOT MONEY TO BUY MORE.: NEVER TRUE

## 2024-08-06 SDOH — ECONOMIC STABILITY: HOUSING INSECURITY
IN THE LAST 12 MONTHS, WAS THERE A TIME WHEN YOU DID NOT HAVE A STEADY PLACE TO SLEEP OR SLEPT IN A SHELTER (INCLUDING NOW)?: NO

## 2024-08-06 SDOH — HEALTH STABILITY: PHYSICAL HEALTH
ON AVERAGE, HOW MANY DAYS PER WEEK DO YOU ENGAGE IN MODERATE TO STRENUOUS EXERCISE (LIKE A BRISK WALK)?: PATIENT DECLINED

## 2024-08-06 ASSESSMENT — PATIENT HEALTH QUESTIONNAIRE - PHQ9
5. POOR APPETITE OR OVEREATING: MORE THAN HALF THE DAYS
4. FEELING TIRED OR HAVING LITTLE ENERGY: SEVERAL DAYS
SUM OF ALL RESPONSES TO PHQ QUESTIONS 1-9: 7
SUM OF ALL RESPONSES TO PHQ QUESTIONS 1-9: 7
9. THOUGHTS THAT YOU WOULD BE BETTER OFF DEAD, OR OF HURTING YOURSELF: NOT AT ALL
1. LITTLE INTEREST OR PLEASURE IN DOING THINGS: SEVERAL DAYS
7. TROUBLE CONCENTRATING ON THINGS, SUCH AS READING THE NEWSPAPER OR WATCHING TELEVISION: NOT AT ALL
10. IF YOU CHECKED OFF ANY PROBLEMS, HOW DIFFICULT HAVE THESE PROBLEMS MADE IT FOR YOU TO DO YOUR WORK, TAKE CARE OF THINGS AT HOME, OR GET ALONG WITH OTHER PEOPLE: NOT DIFFICULT AT ALL
3. TROUBLE FALLING OR STAYING ASLEEP: NOT AT ALL
6. FEELING BAD ABOUT YOURSELF - OR THAT YOU ARE A FAILURE OR HAVE LET YOURSELF OR YOUR FAMILY DOWN: NOT AT ALL
2. FEELING DOWN, DEPRESSED OR HOPELESS: NEARLY EVERY DAY
SUM OF ALL RESPONSES TO PHQ QUESTIONS 1-9: 7
8. MOVING OR SPEAKING SO SLOWLY THAT OTHER PEOPLE COULD HAVE NOTICED. OR THE OPPOSITE, BEING SO FIGETY OR RESTLESS THAT YOU HAVE BEEN MOVING AROUND A LOT MORE THAN USUAL: NOT AT ALL
SUM OF ALL RESPONSES TO PHQ9 QUESTIONS 1 & 2: 4
SUM OF ALL RESPONSES TO PHQ QUESTIONS 1-9: 7

## 2024-08-06 ASSESSMENT — ENCOUNTER SYMPTOMS
CHEST TIGHTNESS: 0
ALLERGIC/IMMUNOLOGIC NEGATIVE: 1
COUGH: 1
COLOR CHANGE: 0
SHORTNESS OF BREATH: 0
ABDOMINAL PAIN: 0

## 2024-08-06 NOTE — PROGRESS NOTES
Attending Physician Statement  I have discussed the care of Chevy Galeano59 y.o.  male, including pertinent history and exam findings,  with the resident Jean Enciso MD.  History and Exam:   Chief Complaint   Patient presents with    Cough      New patient, Patient state that he been having a cough for about almost a year maybe longer.       Patient seen and examined with resident team present.  Patient appears comfortable in no acute distress.  Patient is having non- productive cough for the last year.  Patient also states that he has stopped smoking for last week but is having significant cravings.  Reviewed different treatment options for patient including nicotine replacement products, Zyban and Chantix.  Patient highly interested in Chantix due to successful rate.  Patient denies any difficulty breathing at rest or significant symptoms regarding this.  Patient lungs clear to auscultation bilaterally with good air movement but decreased breath sounds at the bases.      Tobacco Use      Smoking status: Every Day        Packs/day: 0.50        Years: 0.5 packs/day for 20.0 years (10.0 ttl pk-yrs)        Types: Cigarettes      Smokeless tobacco: Never       Past Medical History:   Diagnosis Date    H. pylori infection     KENJI (obstructive sleep apnea) 6/14/2012     No Known Allergies I have seen and examined the patient and the key elements of the encounter have been performed by me.  BP Readings from Last 3 Encounters:   08/06/24 122/77   07/25/24 111/75   07/11/24 (!) 131/98     /77 (Site: Left Upper Arm, Position: Sitting, Cuff Size: Medium Adult)   Pulse 68   Ht 1.854 m (6' 0.99\")   Wt 72.8 kg (160 lb 6.4 oz)   BMI 21.17 kg/m²   Lab Results   Component Value Date    WBC 8.0 08/06/2024    HGB 14.5 08/06/2024    HCT 46.1 08/06/2024     08/06/2024    ALT 10 07/29/2016    AST 14 07/29/2016     08/06/2024    K 4.7 08/06/2024     08/06/2024    CREATININE 1.4 (H) 08/06/2024    BUN 18

## 2024-08-06 NOTE — PATIENT INSTRUCTIONS
[facility] Financial Resources*  (Call United Way/211 if need more resources).       Area Office on Aging of [facility] ([place] and surrounding [facility]):  What they offer: Assisted Living Waiver Program, Medicare benefits counseling, and referral to community resources.  Phone Number: [phone]   [place] [facility] ([place] and counties to the east):  What they offer: Assistance with utility expenses.  Phone Number: [phone]   [facility] Veterans Service Commission:  What they offer:  Assistance with rent or mortgage payments, utilities, auto payments or repair, food, medical prescriptions, transportation to VA medical facilities for veterans and their widows who qualify.  Phone Number: [phone]   [facility] Community Action Commission ([place] and counties to the west):   What they offer: Assistance with utility expenses.  Phone Number: [phone]  [place] [employer] ([employer]):  What they offer: Medicaid, SNAP (food stamps), TANF (cash assistance), and childcare assistance.  Phone Number: [phone]  [employer] ([facility]):  What they offer: Assistance with utility expenses.  Phone Number: 567.307.9153 ext: x11   Community Action Commission of [place]:  What they offer: Electric, gas and water payment service.  Phone: [phone]    [facility] Agency on Aging, District 5:    [place], [place], [place], [place], [place], [place], [place], [place], [facility]:  What they offer: Referral to transportation and other resources for seniors.  Phone Number: [phone]   [place]    First Call for Help     [facility]  What they offer: Information and referral services about food, housing, utility assistance, drug and alcohol treatment, child and family support  Phone number: [phone]    [employer]  What they offer: Programs for children, addiction recovery, family restoration  Phone number:

## 2024-08-06 NOTE — PROGRESS NOTES
Visit Information    Have you changed or started any medications since your last visit including any over-the-counter medicines, vitamins, or herbal medicines? no   Are you having any side effects from any of your medications? -  no  Have you stopped taking any of your medications? Is so, why? -  no    Have you seen any other physician or provider since your last visit? No  Have you had any other diagnostic tests since your last visit? No  Have you been seen in the emergency room and/or had an admission to a hospital since we last saw you? No  Have you had your routine dental cleaning in the past 6 months? no    Have you activated your [employer] account? If not, what are your barriers? Yes     Patient Care Team:  Jean Bourgeois MD as PCP - General (Family Medicine)    Medical History Review  Past Medical, Family, and Social History reviewed and  contribute to the patient presenting condition    Health Maintenance   Topic Date Due    COVID-19 Vaccine (1) Never done    Pneumococcal 0-64 years Vaccine (1 of 2 - PCV) Never done    Depression Screen  Never done    HIV screen  Never done    Hepatitis C screen  Never done    DTaP/Tdap/Td vaccine (1 - Tdap) Never done    Lipids  Never done    Colorectal Cancer Screen  Never done    Shingles vaccine (2 of 3) 09/28/2016    Respiratory Syncytial Virus (RSV) Pregnant or age 60 yrs+ (1 - 1-dose 60+ series) Never done    Flu vaccine (1) 08/01/2024    GFR test (Diabetes, CKD 3-4, OR last GFR 15-59)  07/11/2025    Hepatitis A vaccine  Aged Out    Hepatitis B vaccine  Aged Out    Hib vaccine  Aged Out    Polio vaccine  Aged Out    Meningococcal (ACWY) vaccine  Aged Out

## 2024-08-06 NOTE — PROGRESS NOTES
[facility] Residency Program - Outpatient Note      Subjective:    Chevy Galeano is a 59 y.o. male with  has a past medical history of H. pylori infection and KENJI (obstructive sleep apnea)., CKD.    Presented to the office today for:  Chief Complaint   Patient presents with    Cough      New patient, Patient state that he been having a cough for about almost a year maybe longer.       HPI    Cough:  The patient presents with a cough occasional productive with green sputum that has persisted for 2 years. The cough is constant and not relieved or exacerbated by any known factors. The patient has a history of chronic cigarette smoking. The patient denies fever, chills, shortness of breath, heartburn, recent infections, travel to TB-endemic countries, or contact with TB patients.     Left Knee Pain:  The patient has a diagnosis of osteoarthritis and is under the care of orthopedic surgery. The patient reports pain and effusion in the left knee, which have improved with Voltaren pills and steroid injections.      He has no other active complaints.      Review of Systems   Constitutional:  Negative for chills, fatigue and fever.   HENT:  Positive for congestion. Negative for sinus pain, sneezing, sore throat and trouble swallowing.    Respiratory:  Positive for cough. Negative for chest tightness, shortness of breath, wheezing and stridor.    Cardiovascular:  Negative for chest pain, palpitations and leg swelling.   Musculoskeletal:  Positive for arthralgias.         The patient has a   Family History   Problem Relation Age of Onset    Cancer Mother     High Blood Pressure Mother     Stroke Sister        Objective:    /77 (Site: Left Upper Arm, Position: Sitting, Cuff Size: Medium Adult)   Pulse 68   Ht 1.854 m (6' 0.99\")   Wt 72.8 kg (160 lb 6.4 oz)   BMI 21.17 kg/m²    BP Readings from Last 3 Encounters:   08/06/24 122/77   07/25/24 111/75   07/11/24 (!) 131/98

## 2024-08-07 ENCOUNTER — TELEPHONE (OUTPATIENT)
Dept: FAMILY MEDICINE CLINIC | Age: 59
End: 2024-08-07

## 2024-08-07 PROBLEM — M17.12 PRIMARY OSTEOARTHRITIS OF LEFT KNEE: Status: ACTIVE | Noted: 2024-08-07

## 2024-08-07 PROBLEM — J30.89 NON-SEASONAL ALLERGIC RHINITIS: Status: ACTIVE | Noted: 2024-08-07

## 2024-08-07 PROBLEM — Z72.0 NICOTINE ABUSE: Status: ACTIVE | Noted: 2024-08-07

## 2024-08-07 PROBLEM — R05.3 CHRONIC COUGH: Status: ACTIVE | Noted: 2024-08-07

## 2024-08-07 ASSESSMENT — ENCOUNTER SYMPTOMS
SORE THROAT: 0
SHORTNESS OF BREATH: 0
COUGH: 1
STRIDOR: 0
TROUBLE SWALLOWING: 0
CHEST TIGHTNESS: 0
WHEEZING: 0
SINUS PAIN: 0

## 2024-08-07 NOTE — TELEPHONE ENCOUNTER
Chevy JOHN Galeano was contacted by a Community Health Navigator to discuss a referral for SDOH related needs.     Writer spoke with: Patient and explained the services and assistance that can be provided by a Community Health Navigator.     Patient agreeable to receiving resources and support from writer.     Intake Notes: Writer spoke with patient regarding SDOH Needs, patient stated he wanted to file for social security disability. Patient given the information for Social Security  Adarsh Giron [phone] that can assist patient with social security questions and needs. Patient states once he obtains income from social security he will need assistance with housing. Writer informed patient that she will mail housing resources now so that patient can get on waiting list for apartments that are based of rent.    Actions to be completed by writer:  see above    Actions to be completed by patient:  see above      Gerri David MA

## 2024-08-09 ENCOUNTER — HOSPITAL ENCOUNTER (OUTPATIENT)
Dept: PULMONOLOGY | Age: 59
Discharge: HOME | End: 2024-08-09
Payer: COMMERCIAL

## 2024-08-09 DIAGNOSIS — R05.3 CHRONIC COUGH: ICD-10-CM

## 2024-08-09 PROCEDURE — 94010 BREATHING CAPACITY TEST: CPT

## 2024-08-09 PROCEDURE — 94726 PLETHYSMOGRAPHY LUNG VOLUMES: CPT

## 2024-08-09 PROCEDURE — 94060 EVALUATION OF WHEEZING: CPT

## 2024-08-09 PROCEDURE — 94729 DIFFUSING CAPACITY: CPT

## 2024-08-09 PROCEDURE — 94664 DEMO&/EVAL PT USE INHALER: CPT

## 2024-08-09 PROCEDURE — 94640 AIRWAY INHALATION TREATMENT: CPT

## 2024-08-10 NOTE — PROCEDURES
[address] [place] [address]                           PULMONARY FUNCTION      PATIENT NAME: SANCHEZ DORSEY                 : 1965  MED REC NO: [id number]                         ROOM:   ACCOUNT NO: [id number]                       ADMIT DATE: 2024  PROVIDER: Odin Ag MD      DATE OF PROCEDURE: 2024    The spirometry shows FVC is 3.33, 71% predicted.  FEV1 is 2.10, 58% predicted, consistent with moderate obstructive ventilator impairment.  FEV1/FVC ratio is consistent with moderate obstructive ventilator impairment.  Post bronchodilator, there is significant improvement in FEV1.  Post bronchodilator, FEV1 was 80%, which is 22% improvement consistent with significant response to bronchodilator.  Lung volume shows residual volume is 2.67, 113% of predicted.  Total lung capacity is 5.50, 74% predicted, which could be consistent with mild restrictive ventilatory impairment, which could be intraparenchymal.  Diffusion capacity is 24.75, 84% predicted, which is within normal limits.    IMPRESSION:  This pulmonary function test is consistent with moderate obstructive ventilator impairment with significant response to bronchodilator consistent with bronchospasticy/airway reversibility.  There is concomitant mild restrictive ventilatory impairment, which could be intraparenchymal.  Clinical correlation is recommended.          ODIN AG MD      D:  2024 15:35:12     T:  08/10/2024 05:47:22     ZIGGY/YOSI  Job #:  [id number]     Doc#:  [id number]

## 2024-08-14 ENCOUNTER — HOSPITAL ENCOUNTER (OUTPATIENT)
Dept: PHYSICAL THERAPY | Age: 59
Setting detail: THERAPIES SERIES
Discharge: HOME | End: 2024-08-14
Payer: COMMERCIAL

## 2024-08-14 PROCEDURE — 97110 THERAPEUTIC EXERCISES: CPT

## 2024-08-14 PROCEDURE — 97016 VASOPNEUMATIC DEVICE THERAPY: CPT

## 2024-08-14 NOTE — TELEPHONE ENCOUNTER
Chevy JOHN Galeano was contacted by a Community Health Navigator for follow-up regarding SDOH-related needs.     Writer spoke with: Patient    Progress Notes: Writer called to verify information have been received via mail. Patient stated he received housing resources and he is meeting with social security  on Friday. He also reached out to Area office of Aging for resources.    Actions to be completed by writer:  will followup in one week to see if additional resources are needed.    Actions to be completed by patient:  see above    Gerri David MA

## 2024-08-14 NOTE — FLOWSHEET NOTE
Yes  [] No  Location: L knee  Pain Rating: (0-10 scale) 7-8/10  Pain altered Tx:  [x] No  [] Yes  Action:  Comments: Patient arrives with pain in the L knee.  Pain gets worse with walking and avoids stairs.  Adjust himself when sitting in a chair to alleviate knee pain.  Notes this pain level is normal for him.      Objective:  Modalities: Vaso compression in supine Mod pressure @ 38 degrees x10 min  Precautions:  Exercises:  Exercise Reps/ Time Weight/ Level Comments   SciFit 5' Level 2          Standing      Incline stretch 3x20\"     Heel raises 20x     Marches 10x     HS curls 20x ea     Hip abd 10x ea           Seated      Hamstring stretch 3x20\"     LAQ 10x           Supine      Quad sets 10x     Heel slides 10x     SAQ 20x     Bridges 10x     SLR 5x  AA                Other:      Treatment Charges: Mins Units   []  Modalities     [x]  Ther Exercise 37 2   []  Manual Therapy     []  Ther Activities     []  Neuro Re-ed     [x]  Vasocompression 10 1   [] Gait     []  Other     Total Billable time 47 3       Assessment: [x] Progressing toward goals.  Started on SciFit to promote knee flexion and increase blood flow.  Initiated standing exercises with good carry over.  Patient noted increased pain during LAQ/ SAQ and quad sets.  Required AA with SLR due to weakness. Ended with vaso compression to decrease pain and edema. Reviewed and updated HEP.     [] No change.     [] Other:  [x] Patient would continue to benefit from skilled physical therapy services in order to:  improve left LE strength and ROM, improve functional mobility, decrease left knee pain and effusion        Problem list, as detailed above:   [x] ? Pain                       [x] ? ROM                      [x] ? Strength                 [x] ? Function:   [] ? Balance  [x] Edema  [] Postural Deviations  [x] Gait Deviations  [] Other               LTG: (to be met in 10 treatments)  ? Pain: 3/10 Left knee pain on average with ADLs  ? ROM: patient

## 2024-08-19 ENCOUNTER — HOSPITAL ENCOUNTER (OUTPATIENT)
Dept: PHYSICAL THERAPY | Age: 59
Setting detail: THERAPIES SERIES
Discharge: HOME | End: 2024-08-19
Payer: COMMERCIAL

## 2024-08-19 PROCEDURE — 97110 THERAPEUTIC EXERCISES: CPT

## 2024-08-19 PROCEDURE — 97016 VASOPNEUMATIC DEVICE THERAPY: CPT

## 2024-08-19 NOTE — FLOWSHEET NOTE
x daily - 7 x weekly - 3 sets - 10 reps  - Seated Hamstring Stretch  - 1 x daily - 7 x weekly - 3 sets - 10 reps  - SLR   - 1 x daily - 7 x weekly - 3 sets - 10 reps    Exercises: HANDOUT ONLY   Access Code: [email or web address]  URL: [email or web address]  Date: 07/29/2024  Prepared by: Acosta Zarate     Exercises  - Supine Quad Set  - 1 x daily - 7 x weekly - 3 sets - 10 reps  - Supine Heel Slide  - 1 x daily - 7 x weekly - 3 sets - 10 reps  - Seated Long Arc Quad  - 1 x daily - 7 x weekly - 3 sets - 10 reps       Plan: [x] Continue current frequency toward long and short term goals.    [x] Specific Instructions for subsequent treatments:  L knee ROM, L knee strengthening, Edema control         Time In: 1340             Time Out: 1426      Electronically signed by:  Haroon Bautista PTA

## 2024-08-26 ENCOUNTER — HOSPITAL ENCOUNTER (OUTPATIENT)
Dept: PHYSICAL THERAPY | Age: 59
Setting detail: THERAPIES SERIES
Discharge: HOME | End: 2024-08-26
Payer: COMMERCIAL

## 2024-08-26 NOTE — TELEPHONE ENCOUNTER
Chevy JOHN Galeano was contacted by a Community Health Navigator for follow-up regarding SDOH-related needs.     Writer spoke with: Patient    Progress Notes: Writer spoke with patient regarding current SDOH Needs, Patient was able to file for social security with . Patient received housing resources via mail. Patient had no further needs at this time and will contact pcp if needs arise in the future.    Actions to be completed by writer: Close referral.    Actions to be completed by patient: Close referral.    Gerri David MA

## 2024-08-28 DIAGNOSIS — J30.89 NON-SEASONAL ALLERGIC RHINITIS, UNSPECIFIED TRIGGER: ICD-10-CM

## 2024-08-29 NOTE — TELEPHONE ENCOUNTER
Last visit:   Last Med refill:   Does patient have enough medication for 72 hours: No:     Next Visit Date:  Future Appointments   Date Time Provider Department Center   8/30/2024  2:00 PM Haroon Bautista PTA [facility]   9/11/2024 11:10 AM SCHEDULE, [facility]   10/8/2024  1:30 PM Jean Bourgeois MD [facility] DEP   10/21/2024  9:00 AM SCHEDULE, [facility]       Health Maintenance   Topic Date Due    Pneumococcal 0-64 years Vaccine (1 of 2 - PCV) Never done    HIV screen  Never done    Hepatitis C screen  Never done    DTaP/Tdap/Td vaccine (1 - Tdap) Never done    Lipids  Never done    Colorectal Cancer Screen  Never done    Shingles vaccine (2 of 3) 09/28/2016    COVID-19 Vaccine (1 - 2023-24 season) Never done    Respiratory Syncytial Virus (RSV) Pregnant or age 60 yrs+ (1 - 1-dose 60+ series) Never done    Flu vaccine (1) 08/01/2024    Depression Screen  08/06/2025    GFR test (Diabetes, CKD 3-4, OR last GFR 15-59)  08/06/2025    Hepatitis A vaccine  Aged Out    Hepatitis B vaccine  Aged Out    Hib vaccine  Aged Out    Polio vaccine  Aged Out    Meningococcal (ACWY) vaccine  Aged Out       Hemoglobin A1C (%)   Date Value   06/30/2012 4.8             ( goal A1C is < 7)   No components found for: \"LABMICR\"  No components found for: \"LDLCHOLESTEROL\", \"LDLCALC\"    (goal LDL is <100)   AST (U/L)   Date Value   07/29/2016 14     ALT (U/L)   Date Value   07/29/2016 10     BUN (mg/dL)   Date Value   08/06/2024 18     BP Readings from Last 3 Encounters:   08/06/24 122/77   07/25/24 111/75   07/11/24 (!) 131/98          (goal 120/80)    All Future Testing planned in [facility]  Lab Frequency Next Occurrence               Patient Active Problem List:     Headache behind the eyes     KENJI (obstructive sleep apnea)     Acute low back pain due to trauma     Impingement syndrome of left shoulder     Peritonsillar abscess     Acute pulmonary embolism

## 2024-08-30 ENCOUNTER — HOSPITAL ENCOUNTER (OUTPATIENT)
Dept: PHYSICAL THERAPY | Age: 59
Setting detail: THERAPIES SERIES
End: 2024-08-30
Payer: COMMERCIAL

## 2024-08-30 RX ORDER — LORATADINE 10 MG/1
10 TABLET ORAL DAILY
Qty: 90 TABLET | Refills: 5 | Status: SHIPPED | OUTPATIENT
Start: 2024-08-30

## 2024-09-25 ENCOUNTER — OFFICE VISIT (OUTPATIENT)
Dept: ORTHOPEDIC SURGERY | Age: 59
End: 2024-09-25
Payer: COMMERCIAL

## 2024-09-25 VITALS — BODY MASS INDEX: 21.2 KG/M2 | HEIGHT: 73 IN | WEIGHT: 160 LBS

## 2024-09-25 DIAGNOSIS — M17.12 PRIMARY OSTEOARTHRITIS OF LEFT KNEE: Primary | ICD-10-CM

## 2024-09-25 PROCEDURE — 99213 OFFICE O/P EST LOW 20 MIN: CPT | Performed by: ORTHOPAEDIC SURGERY

## 2024-09-25 RX ORDER — DICLOFENAC SODIUM 75 MG/1
75 TABLET, DELAYED RELEASE ORAL 2 TIMES DAILY
Qty: 60 TABLET | Refills: 2 | Status: SHIPPED | OUTPATIENT
Start: 2024-09-25

## 2024-09-25 RX ORDER — DICLOFENAC SODIUM 75 MG/1
75 TABLET, DELAYED RELEASE ORAL 2 TIMES DAILY
Qty: 60 TABLET | Refills: 2 | Status: SHIPPED | OUTPATIENT
Start: 2024-09-25 | End: 2024-09-25

## 2024-09-30 NOTE — PROGRESS NOTES
2 times daily     Dispense:  60 tablet     Refill:  2    diclofenac (VOLTAREN) 75 MG EC tablet     Sig: Take 1 tablet by mouth 2 times daily     Dispense:  60 tablet     Refill:  2    Compression Stockings MISC     Sig: by Does not apply route     Dispense:  1 each     Refill:  2          Orders Placed This Encounter   Procedures    [facility] Physical Therapy [facility]     Referral Priority:   Routine     Referral Type:   Eval and Treat     Referral Reason:   Specialty Services Required     Requested Specialty:   Physical Therapy     Number of Visits Requested:   1       Ismael Chao DO  Orthopedic Surgery, PGY-1  [place], [place]

## 2024-10-08 ENCOUNTER — OFFICE VISIT (OUTPATIENT)
Dept: FAMILY MEDICINE CLINIC | Age: 59
End: 2024-10-08
Payer: COMMERCIAL

## 2024-10-08 VITALS
DIASTOLIC BLOOD PRESSURE: 82 MMHG | SYSTOLIC BLOOD PRESSURE: 138 MMHG | TEMPERATURE: 97.6 F | WEIGHT: 161 LBS | HEIGHT: 73 IN | BODY MASS INDEX: 21.34 KG/M2 | HEART RATE: 63 BPM

## 2024-10-08 DIAGNOSIS — R03.0 ELEVATED BLOOD PRESSURE READING IN OFFICE WITHOUT DIAGNOSIS OF HYPERTENSION: ICD-10-CM

## 2024-10-08 DIAGNOSIS — R05.3 CHRONIC COUGH: ICD-10-CM

## 2024-10-08 DIAGNOSIS — Z12.11 COLON CANCER SCREENING: ICD-10-CM

## 2024-10-08 DIAGNOSIS — Z13.220 SCREENING FOR HYPERLIPIDEMIA: ICD-10-CM

## 2024-10-08 DIAGNOSIS — J41.8 MIXED SIMPLE AND MUCOPURULENT CHRONIC BRONCHITIS (HCC): Primary | ICD-10-CM

## 2024-10-08 DIAGNOSIS — Z72.0 NICOTINE ABUSE: ICD-10-CM

## 2024-10-08 PROCEDURE — 90677 PCV20 VACCINE IM: CPT | Performed by: FAMILY MEDICINE

## 2024-10-08 PROCEDURE — 99214 OFFICE O/P EST MOD 30 MIN: CPT

## 2024-10-08 PROCEDURE — 90656 IIV3 VACC NO PRSV 0.5 ML IM: CPT

## 2024-10-08 RX ORDER — ALBUTEROL SULFATE 90 UG/1
2 INHALANT RESPIRATORY (INHALATION) 4 TIMES DAILY PRN
Qty: 54 G | Refills: 1 | Status: SHIPPED | OUTPATIENT
Start: 2024-10-08

## 2024-10-08 RX ORDER — VARENICLINE TARTRATE 1 MG/1
1 TABLET, FILM COATED ORAL 2 TIMES DAILY
Qty: 60 TABLET | Refills: 5 | Status: SHIPPED | OUTPATIENT
Start: 2024-10-08

## 2024-10-08 ASSESSMENT — ENCOUNTER SYMPTOMS
CHEST TIGHTNESS: 0
TROUBLE SWALLOWING: 0
SHORTNESS OF BREATH: 0
WHEEZING: 0
STRIDOR: 0
EYE REDNESS: 0
SORE THROAT: 0
COUGH: 1

## 2024-10-08 NOTE — PROGRESS NOTES
Visit Information    Have you changed or started any medications since your last visit including any over-the-counter medicines, vitamins, or herbal medicines? no   Have you stopped taking any of your medications? Is so, why? -  no  Are you having any side effects from any of your medications? - no    Have you seen any other physician or provider since your last visit?  no   Have you had any other diagnostic tests since your last visit?  no   Have you been seen in the emergency room and/or had an admission in a hospital since we last saw you?  no   Have you had your routine dental cleaning in the past 6 months?  no     Do you have an active MyChart account? If no, what is the barrier?  Yes    Patient Care Team:  Jean Bourgeois MD as PCP - General (Family Medicine)    Medical History Review  Past Medical, Family, and Social History reviewed and does not contribute to the patient presenting condition    Health Maintenance   Topic Date Due    Pneumococcal 0-64 years Vaccine (1 of 2 - PCV) Never done    HIV screen  Never done    Hepatitis C screen  Never done    DTaP/Tdap/Td vaccine (1 - Tdap) Never done    Lipids  Never done    Colorectal Cancer Screen  Never done    Shingles vaccine (2 of 3) 09/28/2016    Respiratory Syncytial Virus (RSV) Pregnant or age 60 yrs+ (1 - 1-dose 60+ series) Never done    Flu vaccine (1) 08/01/2024    COVID-19 Vaccine (1 - 2023-24 season) Never done    Depression Screen  08/06/2025    GFR test (Diabetes, CKD 3-4, OR last GFR 15-59)  08/06/2025    Hepatitis A vaccine  Aged Out    Hepatitis B vaccine  Aged Out    Hib vaccine  Aged Out    Polio vaccine  Aged Out    Meningococcal (ACWY) vaccine  Aged Out

## 2024-10-08 NOTE — PROGRESS NOTES
ATTENDING NOTE    I have reviewed key elements of Chevy Galeano history and exam. I have reviewed that chart. And I examined Chevy Galeano   I have discussed the treatment plan with the resident and agree with the plan.  GC emmavYonne Reece was seen today for results and cough.    Diagnoses and all orders for this visit:    Mixed simple and mucopurulent chronic bronchitis (HCC)    Screening for hyperlipidemia  -     Lipid Panel; Future    Chronic cough  -     albuterol sulfate HFA (VENTOLIN HFA) 108 (90 Base) MCG/ACT inhaler; Inhale 2 puffs into the lungs 4 times daily as needed for Wheezing  -     Pneumococcal, PCV20, PREVNAR 20, (age 6w+), IM, PF    Nicotine abuse  -     varenicline (CHANTIX) 1 MG tablet; Take 1 tablet by mouth 2 times daily    Colon cancer screening  -     FIT-DNA (Cologuard)    Elevated blood pressure reading in office without diagnosis of hypertension    Other orders  -     Influenza, AFLURIA Trivalent, (age 3 y+), IM, Preservative Free, 0.5mL

## 2024-10-08 NOTE — PROGRESS NOTES
[facility] Residency Program - Outpatient Note      Subjective:    Chevy Galeano is a 59 y.o. male with  has a past medical history of H. pylori infection and KENJI (obstructive sleep apnea).    Presented to the office today for:  Chief Complaint   Patient presents with    Results     Patient here for PFT results    Cough     Patient state he has a cough for about 1 year, has pain up under left arm pit when he cough       Patient is here for chronic cough follow-up and discuss results of his PFTs ordered on last visit.    Chronic Cough  Patient has history of chronic cough from 2 years, occasionally productive with clear sputum.  Was started on albuterol inhaler and Claritin for concerns either COPD being the cause or allergic. Per patient, there is no improvement in cough.  He did quit smoking a month ago.  Patient denies fever, chills, eye redness, nasal congestion or any other infectious or allergic symptoms.  Denies chest pain, chest tightness.    Patient is scheduled for a pulmonologist appointment on October 21.  Discussed with patient to continue using albuterol inhaler and discussed that cough might be likely due to irritant airway due to smoking and will take couple of months to recover completely.    CBC, CMP, ESR, CRP ordered on previous visit normal, ruling out infectious or allergic process.  PFTs ordered from previous visit positive for obstructive lung disease with bronchodilator response indicating COPD with asthma component.  Chest x-ray done earlier this year negative for any cardiopulmonary process, as well as chest CT done negative for any pulmonary abnormality for pulmonary embolism      Patient also complains of pain under his left arm when coughing.  No neurological deficit or any other associated complaints.  Discussed that pain is likely musculoskeletal due to excessive coughing      PFTs  PFTs ordered on last visit, reviewed,.  Per PFT report patient does

## 2024-10-21 ENCOUNTER — TELEPHONE (OUTPATIENT)
Dept: PULMONOLOGY | Age: 59
End: 2024-10-21

## 2024-10-21 ENCOUNTER — OFFICE VISIT (OUTPATIENT)
Dept: PULMONOLOGY | Age: 59
End: 2024-10-21
Payer: COMMERCIAL

## 2024-10-21 VITALS
DIASTOLIC BLOOD PRESSURE: 75 MMHG | RESPIRATION RATE: 16 BRPM | BODY MASS INDEX: 21.34 KG/M2 | OXYGEN SATURATION: 100 % | SYSTOLIC BLOOD PRESSURE: 109 MMHG | HEIGHT: 73 IN | HEART RATE: 57 BPM | WEIGHT: 161 LBS

## 2024-10-21 DIAGNOSIS — R05.3 CHRONIC COUGH: ICD-10-CM

## 2024-10-21 DIAGNOSIS — F17.210 SMOKING GREATER THAN 20 PACK YEARS: ICD-10-CM

## 2024-10-21 DIAGNOSIS — Z87.891 PERSONAL HISTORY OF TOBACCO USE, PRESENTING HAZARDS TO HEALTH: ICD-10-CM

## 2024-10-21 DIAGNOSIS — J30.89 NON-SEASONAL ALLERGIC RHINITIS, UNSPECIFIED TRIGGER: ICD-10-CM

## 2024-10-21 DIAGNOSIS — J44.1 CHRONIC OBSTRUCTIVE PULMONARY DISEASE WITH ACUTE EXACERBATION (HCC): Primary | ICD-10-CM

## 2024-10-21 PROCEDURE — 99204 OFFICE O/P NEW MOD 45 MIN: CPT | Performed by: STUDENT IN AN ORGANIZED HEALTH CARE EDUCATION/TRAINING PROGRAM

## 2024-10-21 PROCEDURE — 99406 BEHAV CHNG SMOKING 3-10 MIN: CPT | Performed by: STUDENT IN AN ORGANIZED HEALTH CARE EDUCATION/TRAINING PROGRAM

## 2024-10-21 RX ORDER — AZITHROMYCIN 250 MG/1
TABLET, FILM COATED ORAL
Qty: 6 TABLET | Refills: 0 | Status: SHIPPED | OUTPATIENT
Start: 2024-10-21 | End: 2024-10-31

## 2024-10-21 RX ORDER — FLUTICASONE FUROATE, UMECLIDINIUM BROMIDE AND VILANTEROL TRIFENATATE 200; 62.5; 25 UG/1; UG/1; UG/1
1 POWDER RESPIRATORY (INHALATION) DAILY
Qty: 1 EACH | Refills: 6 | Status: SHIPPED | OUTPATIENT
Start: 2024-10-21

## 2024-10-21 RX ORDER — FLUTICASONE PROPIONATE 50 MCG
2 SPRAY, SUSPENSION (ML) NASAL DAILY
Qty: 16 G | Refills: 0 | Status: SHIPPED | OUTPATIENT
Start: 2024-10-21

## 2024-10-21 RX ORDER — PREDNISONE 20 MG/1
40 TABLET ORAL 2 TIMES DAILY
Qty: 10 TABLET | Refills: 0 | Status: SHIPPED | OUTPATIENT
Start: 2024-10-21 | End: 2024-10-26

## 2024-10-21 NOTE — TELEPHONE ENCOUNTER
Patient was seen today with Dr. Oconnor. Patient states he went to the pharmacy to  his medications and the Trelegy was going to be over $500. Writer explained to patient to call his insurance to see what alternatives are covered. Patient will call back.

## 2024-10-21 NOTE — PROGRESS NOTES
a document that actually reflects the content of the visit, the document can still have some errors including those of syntax and sound-alike substitutions which may escape proof reading.  It such instances, actual meaning can be extrapolated by contextual diversion.

## 2024-10-29 ENCOUNTER — TELEPHONE (OUTPATIENT)
Dept: FAMILY MEDICINE CLINIC | Age: 59
End: 2024-10-29

## 2024-10-29 NOTE — TELEPHONE ENCOUNTER
Writer called Mr. Malick Giron for the patient an Mr. Giron states he have all he need for Mr Galeano. Malick states he only need new referral or if patient see a new specialist doctor then malick need to know.

## 2024-11-12 DIAGNOSIS — J30.89 NON-SEASONAL ALLERGIC RHINITIS, UNSPECIFIED TRIGGER: ICD-10-CM

## 2024-11-12 NOTE — TELEPHONE ENCOUNTER
Last visit: 10/21/24- Dr. Oconnor- new patient   Next visit: 2/24/25- Dr. Ruby    Refill request for Flonase. Per last dictation, patient using Flonase. Last script sent 10/21/24 for 30 day supply. Please see pended script and advise.

## 2024-11-15 ENCOUNTER — HOSPITAL ENCOUNTER (INPATIENT)
Age: 59
LOS: 1 days | Discharge: HOME | DRG: 948 | End: 2024-11-15
Attending: EMERGENCY MEDICINE | Admitting: PSYCHIATRY & NEUROLOGY
Payer: COMMERCIAL

## 2024-11-15 ENCOUNTER — APPOINTMENT (OUTPATIENT)
Dept: MRI IMAGING | Age: 59
DRG: 948 | End: 2024-11-15
Payer: COMMERCIAL

## 2024-11-15 ENCOUNTER — APPOINTMENT (OUTPATIENT)
Dept: CT IMAGING | Age: 59
DRG: 948 | End: 2024-11-15
Payer: COMMERCIAL

## 2024-11-15 ENCOUNTER — APPOINTMENT (OUTPATIENT)
Dept: VASCULAR LAB | Age: 59
DRG: 948 | End: 2024-11-15
Payer: COMMERCIAL

## 2024-11-15 VITALS
BODY MASS INDEX: 21.24 KG/M2 | DIASTOLIC BLOOD PRESSURE: 88 MMHG | OXYGEN SATURATION: 98 % | WEIGHT: 161 LBS | SYSTOLIC BLOOD PRESSURE: 140 MMHG | RESPIRATION RATE: 16 BRPM | TEMPERATURE: 97.8 F | HEART RATE: 54 BPM

## 2024-11-15 DIAGNOSIS — I26.99 ACUTE PULMONARY EMBOLISM WITHOUT ACUTE COR PULMONALE, UNSPECIFIED PULMONARY EMBOLISM TYPE (HCC): ICD-10-CM

## 2024-11-15 DIAGNOSIS — I63.9 CEREBROVASCULAR ACCIDENT (CVA), UNSPECIFIED MECHANISM (HCC): Primary | ICD-10-CM

## 2024-11-15 PROBLEM — R53.1 LEFT-SIDED WEAKNESS: Status: ACTIVE | Noted: 2024-11-15

## 2024-11-15 PROBLEM — F14.10 COCAINE ABUSE (HCC): Status: ACTIVE | Noted: 2024-11-15

## 2024-11-15 PROBLEM — R56.9 SEIZURE (HCC): Status: ACTIVE | Noted: 2024-11-15

## 2024-11-15 LAB
AMPHET UR QL SCN: NEGATIVE
ANION GAP SERPL CALCULATED.3IONS-SCNC: 10 MMOL/L (ref 9–16)
BARBITURATES UR QL SCN: NEGATIVE
BASOPHILS # BLD: 0.04 K/UL (ref 0–0.2)
BASOPHILS NFR BLD: 1 % (ref 0–2)
BENZODIAZ UR QL: NEGATIVE
BUN BLD-MCNC: 17 MG/DL (ref 8–26)
BUN SERPL-MCNC: 17 MG/DL (ref 8–23)
CA-I BLD-SCNC: 1.29 MMOL/L (ref 1.15–1.33)
CALCIUM SERPL-MCNC: 9 MG/DL (ref 8.6–10.4)
CANNABINOIDS UR QL SCN: NEGATIVE
CHLORIDE BLD-SCNC: 107 MMOL/L (ref 98–107)
CHLORIDE SERPL-SCNC: 106 MMOL/L (ref 98–107)
CHOLEST SERPL-MCNC: 138 MG/DL (ref 0–199)
CHOLESTEROL/HDL RATIO: 2.7
CK SERPL-CCNC: 485 U/L (ref 39–308)
CO2 BLD CALC-SCNC: 28 MMOL/L (ref 22–30)
CO2 SERPL-SCNC: 22 MMOL/L (ref 20–31)
COCAINE UR QL SCN: POSITIVE
CREAT SERPL-MCNC: 1.2 MG/DL (ref 0.7–1.2)
EGFR, POC: 69 ML/MIN/1.73M2
EOSINOPHIL # BLD: 0.17 K/UL (ref 0–0.44)
EOSINOPHILS RELATIVE PERCENT: 3 % (ref 1–4)
ERYTHROCYTE [DISTWIDTH] IN BLOOD BY AUTOMATED COUNT: 13.2 % (ref 11.8–14.4)
EST. AVERAGE GLUCOSE BLD GHB EST-MCNC: 85 MG/DL
FENTANYL UR QL: NEGATIVE
GFR, ESTIMATED: 69 ML/MIN/1.73M2
GLUCOSE BLD-MCNC: 101 MG/DL (ref 74–100)
GLUCOSE SERPL-MCNC: 95 MG/DL (ref 74–99)
HBA1C MFR BLD: 4.6 % (ref 4–6)
HCO3 VENOUS: 27.3 MMOL/L (ref 22–29)
HCT VFR BLD AUTO: 39.6 % (ref 40.7–50.3)
HCT VFR BLD AUTO: 41 % (ref 41–53)
HDLC SERPL-MCNC: 52 MG/DL
HGB BLD-MCNC: 12.4 G/DL (ref 13–17)
IMM GRANULOCYTES # BLD AUTO: <0.03 K/UL (ref 0–0.3)
IMM GRANULOCYTES NFR BLD: 0 %
INR PPP: 1
LDLC SERPL CALC-MCNC: 73 MG/DL (ref 0–100)
LYMPHOCYTES NFR BLD: 1.46 K/UL (ref 1.1–3.7)
LYMPHOCYTES RELATIVE PERCENT: 23 % (ref 24–43)
MCH RBC QN AUTO: 31.2 PG (ref 25.2–33.5)
MCHC RBC AUTO-ENTMCNC: 31.3 G/DL (ref 28.4–34.8)
MCV RBC AUTO: 99.7 FL (ref 82.6–102.9)
METHADONE UR QL: NEGATIVE
MONOCYTES NFR BLD: 0.48 K/UL (ref 0.1–1.2)
MONOCYTES NFR BLD: 7 % (ref 3–12)
MYOGLOBIN SERPL-MCNC: 104 NG/ML (ref 28–72)
NEUTROPHILS NFR BLD: 66 % (ref 36–65)
NEUTS SEG NFR BLD: 4.29 K/UL (ref 1.5–8.1)
NRBC BLD-RTO: 0 PER 100 WBC
O2 SAT, VEN: 94.2 % (ref 60–85)
OPIATES UR QL SCN: NEGATIVE
OXYCODONE UR QL SCN: NEGATIVE
PARTIAL THROMBOPLASTIN TIME: 30.9 SEC (ref 23–36.5)
PCO2 VENOUS: 44.2 MM HG (ref 41–51)
PCP UR QL SCN: NEGATIVE
PH VENOUS: 7.4 (ref 7.32–7.43)
PLATELET # BLD AUTO: 218 K/UL (ref 138–453)
PMV BLD AUTO: 9.8 FL (ref 8.1–13.5)
PO2 VENOUS: 72.1 MM HG (ref 30–50)
POC ANION GAP: 10 MMOL/L (ref 7–16)
POC CREATININE: 1.2 MG/DL (ref 0.51–1.19)
POC HEMOGLOBIN (CALC): 14.1 G/DL (ref 13.5–17.5)
POC LACTIC ACID: 1 MMOL/L (ref 0.56–1.39)
POSITIVE BASE EXCESS, VEN: 2 MMOL/L (ref 0–3)
POTASSIUM BLD-SCNC: 4.4 MMOL/L (ref 3.5–4.5)
POTASSIUM SERPL-SCNC: 4.4 MMOL/L (ref 3.7–5.3)
PROTHROMBIN TIME: 12.9 SEC (ref 11.7–14.9)
RBC # BLD AUTO: 3.97 M/UL (ref 4.21–5.77)
SODIUM BLD-SCNC: 143 MMOL/L (ref 138–146)
SODIUM SERPL-SCNC: 138 MMOL/L (ref 136–145)
TEST INFORMATION: ABNORMAL
TRIGL SERPL-MCNC: 67 MG/DL
TROPONIN I SERPL HS-MCNC: 12 NG/L (ref 0–22)
VLDLC SERPL CALC-MCNC: 13 MG/DL (ref 1–30)
WBC OTHER # BLD: 6.5 K/UL (ref 3.5–11.3)

## 2024-11-15 PROCEDURE — 1200000000 HC SEMI PRIVATE

## 2024-11-15 PROCEDURE — 80051 ELECTROLYTE PANEL: CPT

## 2024-11-15 PROCEDURE — 85014 HEMATOCRIT: CPT

## 2024-11-15 PROCEDURE — 84484 ASSAY OF TROPONIN QUANT: CPT

## 2024-11-15 PROCEDURE — 82947 ASSAY GLUCOSE BLOOD QUANT: CPT

## 2024-11-15 PROCEDURE — 70450 CT HEAD/BRAIN W/O DYE: CPT

## 2024-11-15 PROCEDURE — 83036 HEMOGLOBIN GLYCOSYLATED A1C: CPT

## 2024-11-15 PROCEDURE — 99223 1ST HOSP IP/OBS HIGH 75: CPT | Performed by: PSYCHIATRY & NEUROLOGY

## 2024-11-15 PROCEDURE — 2580000003 HC RX 258

## 2024-11-15 PROCEDURE — 85730 THROMBOPLASTIN TIME PARTIAL: CPT

## 2024-11-15 PROCEDURE — 82803 BLOOD GASES ANY COMBINATION: CPT

## 2024-11-15 PROCEDURE — 85610 PROTHROMBIN TIME: CPT

## 2024-11-15 PROCEDURE — 80048 BASIC METABOLIC PNL TOTAL CA: CPT

## 2024-11-15 PROCEDURE — 83874 ASSAY OF MYOGLOBIN: CPT

## 2024-11-15 PROCEDURE — 82553 CREATINE MB FRACTION: CPT

## 2024-11-15 PROCEDURE — 85025 COMPLETE CBC W/AUTO DIFF WBC: CPT

## 2024-11-15 PROCEDURE — 82565 ASSAY OF CREATININE: CPT

## 2024-11-15 PROCEDURE — 6360000002 HC RX W HCPCS

## 2024-11-15 PROCEDURE — 70551 MRI BRAIN STEM W/O DYE: CPT

## 2024-11-15 PROCEDURE — 84520 ASSAY OF UREA NITROGEN: CPT

## 2024-11-15 PROCEDURE — 93005 ELECTROCARDIOGRAM TRACING: CPT

## 2024-11-15 PROCEDURE — 80307 DRUG TEST PRSMV CHEM ANLYZR: CPT

## 2024-11-15 PROCEDURE — 99285 EMERGENCY DEPT VISIT HI MDM: CPT

## 2024-11-15 PROCEDURE — 80061 LIPID PANEL: CPT

## 2024-11-15 PROCEDURE — 82550 ASSAY OF CK (CPK): CPT

## 2024-11-15 PROCEDURE — 70496 CT ANGIOGRAPHY HEAD: CPT

## 2024-11-15 PROCEDURE — 6370000000 HC RX 637 (ALT 250 FOR IP)

## 2024-11-15 PROCEDURE — 70498 CT ANGIOGRAPHY NECK: CPT

## 2024-11-15 PROCEDURE — 6360000004 HC RX CONTRAST MEDICATION

## 2024-11-15 PROCEDURE — 93970 EXTREMITY STUDY: CPT

## 2024-11-15 PROCEDURE — 83605 ASSAY OF LACTIC ACID: CPT

## 2024-11-15 PROCEDURE — 82330 ASSAY OF CALCIUM: CPT

## 2024-11-15 RX ORDER — ATORVASTATIN CALCIUM 80 MG/1
80 TABLET, FILM COATED ORAL NIGHTLY
Qty: 30 TABLET | Refills: 3 | Status: SHIPPED | OUTPATIENT
Start: 2024-11-15

## 2024-11-15 RX ORDER — CLOPIDOGREL BISULFATE 75 MG/1
75 TABLET ORAL DAILY
Status: DISCONTINUED | OUTPATIENT
Start: 2024-11-16 | End: 2024-11-15 | Stop reason: HOSPADM

## 2024-11-15 RX ORDER — IOPAMIDOL 755 MG/ML
90 INJECTION, SOLUTION INTRAVASCULAR
Status: COMPLETED | OUTPATIENT
Start: 2024-11-15 | End: 2024-11-15

## 2024-11-15 RX ORDER — CLOPIDOGREL BISULFATE 75 MG/1
75 TABLET ORAL DAILY
Qty: 21 TABLET | Refills: 0 | Status: SHIPPED | OUTPATIENT
Start: 2024-11-16 | End: 2024-12-07

## 2024-11-15 RX ORDER — ONDANSETRON 4 MG/1
4 TABLET, ORALLY DISINTEGRATING ORAL EVERY 8 HOURS PRN
Status: DISCONTINUED | OUTPATIENT
Start: 2024-11-15 | End: 2024-11-15 | Stop reason: HOSPADM

## 2024-11-15 RX ORDER — ONDANSETRON 2 MG/ML
4 INJECTION INTRAMUSCULAR; INTRAVENOUS EVERY 6 HOURS PRN
Status: DISCONTINUED | OUTPATIENT
Start: 2024-11-15 | End: 2024-11-15 | Stop reason: HOSPADM

## 2024-11-15 RX ORDER — ASPIRIN 81 MG/1
81 TABLET, CHEWABLE ORAL DAILY
Qty: 30 TABLET | Refills: 3 | Status: SHIPPED | OUTPATIENT
Start: 2024-11-16

## 2024-11-15 RX ORDER — ENOXAPARIN SODIUM 100 MG/ML
40 INJECTION SUBCUTANEOUS DAILY
Status: DISCONTINUED | OUTPATIENT
Start: 2024-11-15 | End: 2024-11-15 | Stop reason: HOSPADM

## 2024-11-15 RX ORDER — SODIUM CHLORIDE 0.9 % (FLUSH) 0.9 %
5-40 SYRINGE (ML) INJECTION PRN
Status: DISCONTINUED | OUTPATIENT
Start: 2024-11-15 | End: 2024-11-15 | Stop reason: HOSPADM

## 2024-11-15 RX ORDER — 0.9 % SODIUM CHLORIDE 0.9 %
1000 INTRAVENOUS SOLUTION INTRAVENOUS ONCE
Status: COMPLETED | OUTPATIENT
Start: 2024-11-15 | End: 2024-11-15

## 2024-11-15 RX ORDER — CLOPIDOGREL 300 MG/1
300 TABLET, FILM COATED ORAL ONCE
Status: COMPLETED | OUTPATIENT
Start: 2024-11-15 | End: 2024-11-15

## 2024-11-15 RX ORDER — ASPIRIN 325 MG
325 TABLET ORAL ONCE
Status: COMPLETED | OUTPATIENT
Start: 2024-11-15 | End: 2024-11-15

## 2024-11-15 RX ORDER — ATORVASTATIN CALCIUM 80 MG/1
80 TABLET, FILM COATED ORAL NIGHTLY
Status: DISCONTINUED | OUTPATIENT
Start: 2024-11-15 | End: 2024-11-15 | Stop reason: HOSPADM

## 2024-11-15 RX ORDER — ASPIRIN 81 MG/1
81 TABLET, CHEWABLE ORAL DAILY
Status: DISCONTINUED | OUTPATIENT
Start: 2024-11-16 | End: 2024-11-15 | Stop reason: HOSPADM

## 2024-11-15 RX ORDER — FLUTICASONE PROPIONATE 50 MCG
2 SPRAY, SUSPENSION (ML) NASAL DAILY
Qty: 1 EACH | Refills: 4 | Status: SHIPPED | OUTPATIENT
Start: 2024-11-15

## 2024-11-15 RX ORDER — POLYETHYLENE GLYCOL 3350 17 G/17G
17 POWDER, FOR SOLUTION ORAL DAILY PRN
Status: DISCONTINUED | OUTPATIENT
Start: 2024-11-15 | End: 2024-11-15 | Stop reason: HOSPADM

## 2024-11-15 RX ORDER — SODIUM CHLORIDE 9 MG/ML
INJECTION, SOLUTION INTRAVENOUS PRN
Status: DISCONTINUED | OUTPATIENT
Start: 2024-11-15 | End: 2024-11-15 | Stop reason: HOSPADM

## 2024-11-15 RX ORDER — SODIUM CHLORIDE 0.9 % (FLUSH) 0.9 %
5-40 SYRINGE (ML) INJECTION EVERY 12 HOURS SCHEDULED
Status: DISCONTINUED | OUTPATIENT
Start: 2024-11-15 | End: 2024-11-15 | Stop reason: HOSPADM

## 2024-11-15 RX ADMIN — SODIUM CHLORIDE, PRESERVATIVE FREE 10 ML: 5 INJECTION INTRAVENOUS at 09:21

## 2024-11-15 RX ADMIN — ENOXAPARIN SODIUM 40 MG: 100 INJECTION SUBCUTANEOUS at 09:20

## 2024-11-15 RX ADMIN — IOPAMIDOL 90 ML: 755 INJECTION, SOLUTION INTRAVENOUS at 05:42

## 2024-11-15 RX ADMIN — CLOPIDOGREL BISULFATE 300 MG: 300 TABLET, FILM COATED ORAL at 06:03

## 2024-11-15 RX ADMIN — SODIUM CHLORIDE 1000 ML: 9 INJECTION, SOLUTION INTRAVENOUS at 06:13

## 2024-11-15 RX ADMIN — ASPIRIN 325 MG: 325 TABLET ORAL at 06:03

## 2024-11-15 ASSESSMENT — ENCOUNTER SYMPTOMS
SORE THROAT: 0
EYE REDNESS: 0
CHEST TIGHTNESS: 0
EYE ITCHING: 0
PHOTOPHOBIA: 0
ABDOMINAL DISTENTION: 0
VOMITING: 0
SHORTNESS OF BREATH: 0
VOICE CHANGE: 0
CONSTIPATION: 0
BLOOD IN STOOL: 0
BACK PAIN: 0
DIARRHEA: 0
TROUBLE SWALLOWING: 0
COUGH: 0
CHOKING: 0
ABDOMINAL PAIN: 0
WHEEZING: 0
STRIDOR: 0
EYE PAIN: 0
NAUSEA: 0
RHINORRHEA: 0
EYE DISCHARGE: 0

## 2024-11-15 ASSESSMENT — PAIN SCALES - GENERAL: PAINLEVEL_OUTOF10: 0

## 2024-11-15 NOTE — ED PROVIDER NOTES
[facility]     Emergency Department     Faculty Attestation    I performed a history and physical examination of the patient and discussed management with the resident. I have reviewed and agree with the resident’s findings including all diagnostic interpretations, and treatment plans as written. Any areas of disagreement are noted on the chart. I was personally present for the key portions of any procedures. I have documented in the chart those procedures where I was not present during the key portions. I have reviewed the emergency nurses triage note. I agree with the chief complaint, past medical history, past surgical history, allergies, medications, social and family history as documented unless otherwise noted below. Documentation of the HPI, Physical Exam and Medical Decision Making performed by rona is based on my personal performance of the HPI, PE and MDM. For Physician Assistant/ Nurse Practitioner cases/documentation I have personally evaluated this patient and have completed at least one if not all key elements of the E/M (history, physical exam, and MDM). Additional findings are as noted.    Note Started: 5:27 AM EST     60 yo M blurred vision / L arm L leg numbness, starting 0500, no cp, no injury, no fever  PE vss gcs 15 ZENOBIA, face symmetric,   Decrease sensation L upper L lower extremity,     EKG Interpretation    Interpreted by me  Sinus rhythm, sinus arrhythmia, hr 61, no ischemia, normal axis, qtc 428    CRITICAL CARE: There was a high probability of clinically significant/life threatening deterioration in this patient's condition which required my urgent intervention.  Total critical care time was 5 minutes.  This excludes any time for separately reportable procedures.       Sylvester Kline, DO  11/15/24 0534       Sylvester Macdonald DO  11/15/24 0544       Sylvester Macdonald DO  11/15/24 0556       Sylvester Macdonald,    Sylvester Macdonald, DO  11/15/24 0559       Sylvester Macdonald, DO  11/24/24 3584

## 2024-11-15 NOTE — ED NOTES
Pt presents to ED via walking through triage c/o left sided extremity weakness. Pt is c/o numbness upon arrival. Pt denies any pain. Pt denies any blood thinner use or previous hx of stroke.   Pt reports going to bed around 11:30 pm last night and waking up around 5 am with left sided upper and lower extremity weakness and blurred vision. LKW 11:30 pm yesterday. Pt denies any chest pain or shortness of breath.  Pt denies any illicit drug or alcohol use PTA.   Pt is alert and oriented x4, answering questions appropriately.  Pt placed on full cardiac monitor, IV access established. Labs drawn.

## 2024-11-15 NOTE — CONSULTS
Stroke Neurology Consult Note  Stroke Alert @ 5:26  Arrival at bedside @ 5:29    Reason for Consult:  ED Stroke Alert  Requesting Physician:  ED team   Endovascular Neurosurgeon: Fahad Vanegas MD  Stroke Team: Tawanda Red MD  History Obtained From:  patient  Chief Complaint:  Acute neurological deficits   Allergies:  Patient has no known allergies.    History of Presenting Illness     Chevy Galeano is a 59 y.o. right handed male with a history of PE who presented with acute onset of left-sided numbness which he noticed upon waking up this morning.  LKW 11:30 PM 11/14/2024.  NIH 2 for left-sided sensory deficit and left facial droop.  CT head showed hypodensity in right subcortical white matter, and CTA was unremarkable. MRI brain showed no acute infarct but chronic mild to moderate small vessel disease.      LKW: 11:30 PM 11/14/2024  NIHSS: 2  Modified [place] Scale: 0  SBP: 135  Glucose: 95  CT head without contrast: No acute abnormalities  TNK: Outside of window  Endovascular: No LVO or flow-limiting stenosis    Review of Systems   Constitutional:  Negative for activity change, chills and fever.   Respiratory:  Negative for cough, chest tightness, shortness of breath, wheezing and stridor.    Cardiovascular:  Negative for chest pain, palpitations and leg swelling.   Gastrointestinal:  Negative for abdominal distention, abdominal pain, blood in stool, constipation, diarrhea, nausea and vomiting.   Genitourinary:  Negative for difficulty urinating, dysuria, frequency and hematuria.   Neurological:  Positive for numbness. Negative for dizziness, light-headedness and headaches.       Past Histories          Diagnosis Date    H. pylori infection     KENJI (obstructive sleep apnea) 6/14/2012         Procedure Laterality Date    UPPER GASTROINTESTINAL ENDOSCOPY  08/23/2016     Social History     Socioeconomic History    Marital status: Single     Spouse name: Not on file    Number of children: Not on file    Years of

## 2024-11-15 NOTE — ED NOTES
ED to inpatient nurses report      Chief Complaint:  Chief Complaint   Patient presents with    Extremity Weakness     Left, 30 min PTA     Present to ED from: Home    MOA:     LOC: alert and orientated to name, place, date  Mobility: Requires assistance * 1 d/t left sided weakness  Oxygen Baseline: Room air    Current needs required: Room air   Pending ED orders: N/A  Present condition: Aox4, left sided facial droop, left sided upper and lower extremity weakness. Pt c/o left sided numbness, but denies pain.     Why did the patient come to the ED? Pt presents to ED via walking through triage c/o left sided extremity weakness. Pt is c/o numbness upon arrival. Pt denies any pain. Pt denies any blood thinner use or previous hx of stroke.   Pt reports going to bed around 11:30 pm last night and waking up around 5 am with left sided upper and lower extremity weakness and blurred vision. LKW 11:30 pm yesterday. Pt denies any chest pain or shortness of breath.  Pt denies any illicit drug or alcohol use PTA.   Pt is alert and oriented x4, answering questions appropriately.  Pt placed on full cardiac monitor, IV access established. Labs drawn.  What is the plan? Stroke workup, admission   Any procedures or intervention occur? Stroke panel, CT scan, possible MRI  Any safety concerns?? Fall risk    Mental Status:  Level of Consciousness: Alert (0)    Psych Assessment:   Psychosocial  Psychosocial (WDL): (S) Exceptions to WDL (poor historian)  Vital signs   Vitals:    11/15/24 0528 11/15/24 0540 11/15/24 0540   BP: 135/83     Pulse: 70 67    Resp: 19     Temp: 97.8 °F (36.6 °C)     TempSrc: Oral     SpO2: 98%     Weight:   73 kg (161 lb)        Vitals:  Patient Vitals for the past 24 hrs:   BP Temp Temp src Pulse Resp SpO2 Weight   11/15/24 0540 -- -- -- -- -- -- 73 kg (161 lb)   11/15/24 0540 -- -- -- 67 -- -- --   11/15/24 0528 135/83 97.8 °F (36.6 °C) Oral 70 19 98 % --      Visit Vitals  /83   Pulse 67   Temp 97.8 °F  (36.6 °C) (Oral)   Resp 19   Wt 73 kg (161 lb)   SpO2 98%   BMI 21.24 kg/m²        LDAs:   Peripheral IV 11/15/24 Right Forearm (Active)   Site Assessment Clean, dry & intact 11/15/24 0528       Peripheral IV 11/15/24 Left Forearm (Active)   Site Assessment Clean, dry & intact 11/15/24 0528       Ambulatory Status:  Presents to emergency department  because of falls (Syncope, seizure, or loss of consciousness): No, Age > 70: No, Altered Mental Status, Intoxication with alcohol or substance confusion (Disorientation, impaired judgment, poor safety awaremess, or inability to follow instructions): Yes, Impaired Mobility: Ambulates or transfers with assistive devices or assistance; Unable to ambulate or transer.: Yes, Nursing Judgement: Yes    Diagnosis:  DISPOSITION Admitted 11/15/2024 05:56:07 AM   Final diagnoses:   None        Consults:  IP CONSULT TO STROKE TEAM     Treatment Team:   Treatment Team:   Wilson Fraser MD Grosack, Nathaniel B, MD Chodisetty, Subrahmanyam, MD Kramer, Ellie, RN    Treatment:          Skin Assessment:        Pain Score:  Pain Assessment  Pain Assessment: None - Denies Pain      SOCIAL HISTORY       Social History     Socioeconomic History    Marital status: Single     Spouse name: None    Number of children: None    Years of education: None    Highest education level: None   Tobacco Use    Smoking status: Every Day     Current packs/day: 0.50     Average packs/day: 0.5 packs/day for 20.0 years (10.0 ttl pk-yrs)     Types: Cigarettes    Smokeless tobacco: Never   Substance and Sexual Activity    Alcohol use: No    Drug use: No     Types: Cocaine     Comment: no drug use x 20 years    Sexual activity: Yes     Social Determinants of Health     Financial Resource Strain: High Risk (8/6/2024)    Overall Financial Resource Strain (CARDIA)     Difficulty of Paying Living Expenses: Hard   Food Insecurity: No Food Insecurity (8/6/2024)    Hunger Vital Sign     Worried About Running  Out of Food in the Last Year: Never true     Ran Out of Food in the Last Year: Never true   Transportation Needs: Unknown (8/6/2024)    PRAPARE - Transportation     Lack of Transportation (Non-Medical): No   Physical Activity: Unknown (8/6/2024)    Exercise Vital Sign     Days of Exercise per Week: Patient declined   Housing Stability: Unknown (8/6/2024)    Housing Stability Vital Sign     Unstable Housing in the Last Year: No       FAMILY HISTORY       Family History   Problem Relation Age of Onset    Cancer Mother     High Blood Pressure Mother     Stroke Sister        ALLERGIES     Patient has no known allergies.    CURRENT MEDICATIONS       Previous Medications    ACETAMINOPHEN (TYLENOL) 500 MG TABLET    Take 1 tablet by mouth 4 times daily as needed for Pain    ALBUTEROL SULFATE HFA (VENTOLIN HFA) 108 (90 BASE) MCG/ACT INHALER    Inhale 2 puffs into the lungs 4 times daily as needed for Wheezing    COMPRESSION STOCKINGS MISC    by Does not apply route    DICLOFENAC (VOLTAREN) 75 MG EC TABLET    Take 1 tablet by mouth 2 times daily    FLUTICASONE (FLONASE) 50 MCG/ACT NASAL SPRAY    2 sprays by Each Nostril route daily    FLUTICASONE-UMECLIDIN-VILANT (TRELEGY ELLIPTA) 200-62.5-25 MCG/ACT AEPB INHALER    Inhale 1 puff into the lungs daily    LORATADINE (CLARITIN) 10 MG TABLET    TAKE 1 TABLET BY MOUTH EVERY DAY    PREDNISOLONE ACETATE (PRED FORTE) 1 % OPHTHALMIC SUSPENSION    Apply 1 drop to eye 3 times daily    VARENICLINE (CHANTIX) 1 MG TABLET    Take 1 tablet by mouth 2 times daily     Orders Placed This Encounter   Medications    iopamidol (ISOVUE-370) 76 % injection 90 mL    aspirin tablet 325 mg    clopidogrel (PLAVIX) tablet 300 mg    aspirin chewable tablet 81 mg    clopidogrel (PLAVIX) tablet 75 mg       SURGICAL HISTORY       Past Surgical History:   Procedure Laterality Date    UPPER GASTROINTESTINAL ENDOSCOPY  08/23/2016       PAST MEDICAL HISTORY       Past Medical History:   Diagnosis Date    H.

## 2024-11-15 NOTE — ED PROVIDER NOTES
[facility] ED  Emergency Department Encounter  Emergency Medicine Resident     Pt Name:Chevy Galeano  MRN: [id number]  Birthdate 1965  Date of evaluation: 11/15/24  PCP:  Jean Bourgeois MD  Note Started: 5:34 AM EST      CHIEF COMPLAINT       Chief Complaint   Patient presents with    Extremity Weakness     Left, 30 min PTA       HISTORY OF PRESENT ILLNESS  (Location/Symptom, Timing/Onset, Context/Setting, Quality, Duration, Modifying Factors, Severity.)      Chevy Galeano is a 59 y.o. male who presents with left-sided weakness.  Patient has a past medical history of CKD, pulmonary embolus who presents with left arm weakness.  Patient is unsure as to when this started.  Patient pacifically endorses decreased sensation and decreased motor strength.  ROS is negative for headaches, chest pain/tightness, nausea/vomiting, urinary/bowel issues.  Patient denies any previous episodes of stroke or weakness.    PAST MEDICAL / SURGICAL / SOCIAL / FAMILY HISTORY      has a past medical history of H. pylori infection and KENJI (obstructive sleep apnea).       has a past surgical history that includes Upper gastrointestinal endoscopy (08/23/2016).      Social History     Socioeconomic History    Marital status: Single     Spouse name: Not on file    Number of children: Not on file    Years of education: Not on file    Highest education level: Not on file   Occupational History    Not on file   Tobacco Use    Smoking status: Every Day     Current packs/day: 0.50     Average packs/day: 0.5 packs/day for 20.0 years (10.0 ttl pk-yrs)     Types: Cigarettes    Smokeless tobacco: Never   Substance and Sexual Activity    Alcohol use: No    Drug use: No     Types: Cocaine     Comment: no drug use x 20 years    Sexual activity: Yes   Other Topics Concern    Not on file   Social History Narrative    Not on file     Social Determinants of Health     Financial Resource Strain: High Risk (8/6/2024)    Overall Financial  soft tissues.     No acute intracranial abnormality.    Labs Reviewed   STROKE PANEL - Abnormal; Notable for the following components:       Result Value    RBC 3.97 (*)     Hemoglobin 12.4 (*)     Hematocrit 39.6 (*)     Total  (*)     Neutrophils % 66 (*)     Lymphocytes % 23 (*)     Myoglobin 104 (*)     All other components within normal limits   VENOUS BLOOD GAS, POINT OF CARE - Abnormal; Notable for the following components:    PO2, Alli 72.1 (*)     O2 Sat, Alli 94.2 (*)     All other components within normal limits   CREATININE W/GFR POINT OF CARE - Abnormal; Notable for the following components:    POC Creatinine 1.2 (*)     All other components within normal limits   POCT GLUCOSE - Abnormal; Notable for the following components:    POC Glucose 101 (*)     All other components within normal limits   ELECTROLYTES PLUS   HGB/HCT   CALCIUM, IONIC (POC)   UREA N (POC)   LACTIC ACID,POINT OF CARE   POCT GLUCOSE              PROCEDURES:    CONSULTS:  IP CONSULT TO STROKE TEAM  IP CONSULT TO SOCIAL WORK    CRITICAL CARE:  There was significant risk of life threatening deterioration of patient's condition requiring my direct management. Critical care time 0 minutes, excluding any documented procedures.    FINAL IMPRESSION      1. Cerebrovascular accident (CVA), unspecified mechanism (HCC)        DISPOSITION / PLAN     DISPOSITION Admitted 11/15/2024 05:56:07 AM           PATIENT REFERRED TO:  No follow-up provider specified.    DISCHARGE MEDICATIONS:  New Prescriptions    No medications on file       Fidel Smith MD  Emergency Medicine Resident    (Please note that portions of this note were completed with a voice recognition program.  Efforts were made to edit the dictations but occasionally words are mis-transcribed.)

## 2024-11-15 NOTE — DISCHARGE SUMMARY
[facility] Neurology   IN-PATIENT SERVICE  DISCHARGE SUMMARY  [facility]    Patient Identification:  Chevy Galeano is a 59 y.o. male.  :  1965  MRN: [id number]     Acct: [id number]   Admit Date:  11/15/2024  Discharge date and time: 11/15/2024  Attending Provider: Wilson Fraser*                                     Admission Diagnoses:   Ischemic stroke (HCC) [I63.9]    Discharge Diagnoses:   Principal Problem (Resolved):    Ischemic stroke (HCC)  Active Problems:    * No active hospital problems. *       Consults:   none    Brief Inpatient course:    Chevy Galeano is a 59 y.o. right handed male with a history of chronic renal disease, subsegmental PE, obstructive sleep apnea, and current tobacco use who presented to ED via walking through triage c/o left sided extremity weakness. Pt is c/o numbness upon arrival. Pt denies any pain. Pt denies any blood thinner use or previous hx of stroke. Pt reports going to bed around 11:30 pm last night and waking up around 5 am with left sided upper and lower extremity weakness and blurred vision. LKW 11:30 pm yesterday. Pt denies any chest pain or shortness of breath. Pt denies any illicit drug or alcohol use PTA. Pt is alert and oriented x4, answering questions appropriately.     Symptoms resolved. UDS +cocaine. MRI brain negative. U/S venous lower ext negative for DVTs. Routine EEG unremarkable.     Suspect TIA 2/2 cocaine use. Discharged in dual antiplatelet therapy, plavix x 21 days. Start on high intensity statin.       Patient is stable from neurological standpoint to be discharged.    Procedures:  EEG    Any Hospital Acquired Infections: none    Discharge Functional Status:  stable    Disposition: home    Patient Instructions:   Please abstain from cocaine use       Discharge Medications:  Current Discharge Medication List        START taking these medications    Details   aspirin 81 MG chewable tablet Take 1 tablet by mouth daily  Qty:

## 2024-11-15 NOTE — CONSULTS
Stroke Neurology Consult Note  Stroke Alert @ 0526 AM  Arrival at bedside @ 0528 AM    Reason for Consult:  ED Stroke Alert  Requesting Physician:  ED team   Endovascular Neurosurgeon: Vivek Morataya MD  Stroke Team: Fahad Vanegas MD  History Obtained From:  patient  Chief Complaint:  Acute neurological deficits   Allergies:  Patient has no known allergies.    History of Presenting Illness     Chevy Galeano is a 59 y.o. right handed male with a history of chronic renal disease, subsegmental PE, obstructive sleep apnea, and current tobacco use who presented to ED via walking through triage c/o left sided extremity weakness. Pt is c/o numbness upon arrival. Pt denies any pain. Pt denies any blood thinner use or previous hx of stroke. Pt reports going to bed around 11:30 pm last night and waking up around 5 am with left sided upper and lower extremity weakness and blurred vision. LKW 11:30 pm yesterday. Pt denies any chest pain or shortness of breath. Pt denies any illicit drug or alcohol use PTA.  Pt is alert and oriented x4, answering questions appropriately.      LKW: 11:30 PM on 11/14/2024  NIHSS: 4  Modified [place] Scale: 0  SBP: 135  Glucose: 101  CT head without contrast: No acute intracranial abnormality.    TNK: N/A  Endovascular: Unremarkable CTA of the head and neck.       Review of Systems   Constitutional:  Negative for activity change, appetite change, chills, diaphoresis, fatigue and fever.   HENT:  Negative for congestion, drooling, rhinorrhea, sore throat, trouble swallowing and voice change.    Eyes:  Negative for photophobia, pain, discharge, redness, itching and visual disturbance.   Respiratory:  Negative for cough, choking, chest tightness, shortness of breath and wheezing.    Cardiovascular:  Negative for chest pain, palpitations and leg swelling.   Gastrointestinal:  Negative for abdominal distention, abdominal pain, constipation, diarrhea and nausea.   Endocrine: Negative for polydipsia, polyphagia  (Non-Medical): No   Physical Activity: Unknown (8/6/2024)    Exercise Vital Sign     Days of Exercise per Week: Patient declined     Minutes of Exercise per Session: Not on file   Stress: Not on file   Social Connections: Not on file   Intimate Partner Violence: Not on file   Housing Stability: Unknown (8/6/2024)    Housing Stability Vital Sign     Unable to Pay for Housing in the Last Year: Not on file     Number of Places Lived in the Last Year: Not on file     Unstable Housing in the Last Year: No         Problem Relation Age of Onset    Cancer Mother     High Blood Pressure Mother     Stroke Sister      Prior to Admission medications    Medication Sig Start Date End Date Taking? Authorizing Provider   fluticasone-umeclidin-vilant (TRELEGY ELLIPTA) 200-62.5-25 MCG/ACT AEPB inhaler Inhale 1 puff into the lungs daily 10/21/24   Gogo Mejia MD   fluticasone (FLONASE) 50 MCG/ACT nasal spray 2 sprays by Each Nostril route daily 10/21/24   Gogo Mejia MD   albuterol sulfate HFA (VENTOLIN HFA) 108 (90 Base) MCG/ACT inhaler Inhale 2 puffs into the lungs 4 times daily as needed for Wheezing 10/8/24   Jean Bourgeois MD   varenicline (CHANTIX) 1 MG tablet Take 1 tablet by mouth 2 times daily 10/8/24   Jean Bourgeois MD   diclofenac (VOLTAREN) 75 MG EC tablet Take 1 tablet by mouth 2 times daily 9/25/24   Ismael Chao DO   Compression Stockings MISC by Does not apply route 9/25/24   Ismael Chao DO   loratadine (CLARITIN) 10 MG tablet TAKE 1 TABLET BY MOUTH EVERY DAY 8/30/24   Jean Bourgeois MD   prednisoLONE acetate (PRED FORTE) 1 % ophthalmic suspension Apply 1 drop to eye 3 times daily 8/1/24 8/1/25  ProviderMonik MD   acetaminophen (TYLENOL) 500 MG tablet Take 1 tablet by mouth 4 times daily as needed for Pain 1/9/24   ЕкатеринаJohn MD     Current Facility-Administered Medications: [START ON 11/16/2024] aspirin chewable tablet 81 mg, 81 mg, Oral, Daily  [START ON 11/16/2024] clopidogrel

## 2024-11-15 NOTE — H&P
11/15/2024 05:30 AM    CREATININE 1.2 11/15/2024 05:27 AM    CALCIUM 9.0 11/15/2024 05:30 AM    GFRAA >60 2016 06:36 AM    LABGLOM 69 11/15/2024 05:30 AM    LABGLOM >60 2023 02:33 AM    GLUCOSE 95 11/15/2024 05:30 AM       Imaging:  CTA HEAD NECK W CONTRAST Result Date: 11/15/2024  Unremarkable CTA of the head and neck.     CT HEAD WO CONTRAST Result Date: 11/15/2024  No acute intracranial abnormality.        NIHSS, mRS, & Stroke decision-making      Initial NIHSS  Time Performed:  0527 AM    1a.  Level of consciousness:  0 - alert; keenly responsive  1b.  Level of consciousness questions:  0 - answers both questions correctly  1c.  Level of consciousness questions:  0 - performs both tasks correctly  2.    Best Gaze:  0 - normal  3.    Visual:  0 - no visual loss  4.    Facial Palsy:  1 - minor paralysis (flattened nasolabial fold, asymmetric on smiling)  5a.  Motor left arm:  2 - some effort against gravity, limb cannot get to or maintain (if cued) 90 (or 45) degrees, drifts down to bed, but has some effort against gravity   5b.  Motor right arm:  0 - no drift, limb holds 90 (or 45) degrees for full 10 seconds  6a.  Motor left le - drift; leg falls by the end of the 5 second period but does not hit bed  6b.  Motor right le - no drift; leg holds 30 degree position for full 5 seconds  7.    Limb Ataxia:  0 - absent  8.    Sensory:  0 - normal; no sensory loss  9.    Best Language:  0 - no aphasia, normal  10.  Dysarthria:  0 - normal  11.  Extinction and Inattention:  0 - no abnormality    Total:  4   Modified Kely Score Scale (mRS):   Pre-admission mRS: 0: No symptoms at all   Current mRS: 2: Slight disability; unable to carry out all previous activities, but able to look after own affairs without assistance      Last Known Well (date and time)   11:30 PM 24   Candidate for IV Tenecteplase therapy    No   [x] due to the following exclusion criteria: Last well more than 4.5 hrs    Candidate for Thrombectomy   No  [x] due to the following exclusion criteria: No LVO on Imaging       Assessment & Plan       Chevy Galeano is a 59 y.o. right handed male with a medical history as noted above who presents for evaluation of acute neurological deficits.       Impression:  Differential Diagnosis: Acute ischemic stroke  Recent reported history of subsegmental PE  Suspected stroke etiology: Cryptogenic Stroke         Disposition: General Neurology Care Status - prefer 1st floor (1C)    Stroke admission order set: [id number] - GEN Ischemic Stroke Non-Thrombolytic Focused      Plan:   Blood pressure goal: Short-term permissive hypertension up to 210-220 systolic while avoiding systolic BP less than 110   Antithrombotic therapy: Aspirin 81 mg qD with additional short course of clopidogrel 75 mg qD for 21 days  DVT Prophylaxis: Enoxaparin   High intensity statin therapy (long-term goal LDL < 70)  Diagnostics: MRI brain without contrast, Transthoracic echocardiogram with bubble study, and Bilateral lower extremity venous duplex ultrasound  Frequent neuro-checks per order set protocol  Basic labs: CBC, CMP, coagulation panel, troponin  Stroke labwork: HgbA1C, lipid panel  Intravenous hydration with normal saline at 75cc per hour  Swallow evaluation prior to advancing diet   Tight glucose control (long-term goal HgbA1c < 6%)  Continuous cardiac telemetry to monitor for arrhythmia  Head of bed > 30 degrees for aspiration prevention and aspiration precautions ordered.  Patient/family given stroke educational materials and education that includes: Personal Risk Factors for Stroke, Stroke Warning Signs/Symptoms, Emergency Actions/Activation of the EMS, Follow-Up after discharge; Medications Prescribed, Smoking Cessation/Risks.   Physical therapy, occupational therapy, speech therapy consults  Consulted social work and case management for help with discharge      Discussed with attending physician.     Santana ROCA  DO Rupesh   11/15/2024  8:58 AM

## 2024-11-16 LAB
EKG ATRIAL RATE: 61 BPM
EKG P AXIS: -4 DEGREES
EKG P-R INTERVAL: 234 MS
EKG Q-T INTERVAL: 426 MS
EKG QRS DURATION: 100 MS
EKG QTC CALCULATION (BAZETT): 428 MS
EKG R AXIS: 81 DEGREES
EKG T AXIS: 65 DEGREES
EKG VENTRICULAR RATE: 61 BPM

## 2024-11-16 PROCEDURE — 93970 EXTREMITY STUDY: CPT | Performed by: STUDENT IN AN ORGANIZED HEALTH CARE EDUCATION/TRAINING PROGRAM

## 2024-11-16 PROCEDURE — 93010 ELECTROCARDIOGRAM REPORT: CPT | Performed by: INTERNAL MEDICINE

## 2024-11-18 ENCOUNTER — TELEPHONE (OUTPATIENT)
Dept: FAMILY MEDICINE CLINIC | Age: 59
End: 2024-11-18

## 2024-11-18 NOTE — TELEPHONE ENCOUNTER
Care Transitions Initial Follow Up Call    Outreach made within 2 business days of discharge: Yes    Patient: Chevy Galeano Patient : 1965   MRN: [id number]  Reason for Admission: Cerebrovascular accident (CVA), unspecified mechanism   Discharge Date: 11/15/24       Spoke with: Patient    Discharge department/facility: [facility] Interactive Patient Contact:  Was patient able to fill all prescriptions: Yes  Was patient instructed to bring all medications to the follow-up visit: Yes  Is patient taking all medications as directed in the discharge summary? Yes  Does patient understand their discharge instructions: Yes  Does patient have questions or concerns that need addressed prior to 7-14 day follow up office visit: no    Additional needs identified to be addressed with provider  No needs identified             Scheduled appointment with PCP within 7-14 days    Follow Up  Future Appointments   Date Time Provider Department Center   2024 11:20 AM SCHEDULE, [facility]   2024 10:30 AM Jean Bourgeois MD [facility] DEP   12/10/2024  2:30 PM [facility] Bour[facility] MD [facility]   2025 10:45 AM Aurelio Ruby MD Resp Spec [facility]       Bess Lord [facility]

## 2024-11-26 ENCOUNTER — OFFICE VISIT (OUTPATIENT)
Dept: FAMILY MEDICINE CLINIC | Age: 59
End: 2024-11-26
Payer: COMMERCIAL

## 2024-11-26 VITALS
BODY MASS INDEX: 20.95 KG/M2 | WEIGHT: 158.8 LBS | HEART RATE: 66 BPM | TEMPERATURE: 97.9 F | DIASTOLIC BLOOD PRESSURE: 75 MMHG | SYSTOLIC BLOOD PRESSURE: 132 MMHG

## 2024-11-26 DIAGNOSIS — R53.1 LEFT-SIDED WEAKNESS: ICD-10-CM

## 2024-11-26 DIAGNOSIS — R05.3 CHRONIC COUGH: ICD-10-CM

## 2024-11-26 DIAGNOSIS — J44.1 COPD EXACERBATION (HCC): Primary | ICD-10-CM

## 2024-11-26 PROCEDURE — 99214 OFFICE O/P EST MOD 30 MIN: CPT

## 2024-11-26 PROCEDURE — 99211 OFF/OP EST MAY X REQ PHY/QHP: CPT

## 2024-11-26 RX ORDER — PREDNISONE 20 MG/1
20 TABLET ORAL 2 TIMES DAILY
Qty: 10 TABLET | Refills: 0 | Status: SHIPPED | OUTPATIENT
Start: 2024-11-26 | End: 2024-12-01

## 2024-11-26 RX ORDER — ALBUTEROL SULFATE 90 UG/1
2 INHALANT RESPIRATORY (INHALATION) 4 TIMES DAILY PRN
Qty: 54 G | Refills: 1 | Status: SHIPPED | OUTPATIENT
Start: 2024-11-26

## 2024-11-26 RX ORDER — OSELTAMIVIR PHOSPHATE 75 MG/1
75 CAPSULE ORAL 2 TIMES DAILY
Qty: 10 CAPSULE | Refills: 0 | Status: SHIPPED | OUTPATIENT
Start: 2024-11-26 | End: 2024-12-01

## 2024-11-26 RX ORDER — IBUPROFEN 800 MG/1
800 TABLET, FILM COATED ORAL 2 TIMES DAILY PRN
Qty: 14 TABLET | Refills: 0 | Status: SHIPPED | OUTPATIENT
Start: 2024-11-26 | End: 2024-12-03

## 2024-11-26 NOTE — PATIENT INSTRUCTIONS
Thank you for letting us take care of you today. We hope all your questions were addressed. If a question was overlooked or something else comes to mind after you return home, please contact a member of your Care Team listed below.      Your Care Team at [facility] is Team #  Chris Mcfarlane M.D. (Faculty)  Romi Barbosa M.D. (Resident)  Shakila Nelson M.D. (Resident)   Verenice Hoover M.D. (Resident)  Jean Bourgeois M.D. (Resident)  Tuyet River M.D. (Resident)  Aga Paul., [facility]  Bess Lord, [facility]  Jane Spencer, [facility]  Shadi Mac, GRABIEL Hammond, [facility]  Mellisa Simon, [facility]  Purvi Lutz, GRABIEL Yo (LJ) MARANDA Leblanc (Clinical Practice Manager)  Mindi Mishra [facility] (Clinical Pharmacist)     Office phone number: [phone]    If you need to get in right away due to illness, please be advised we have \"Same Day\" appointments available Monday-Friday. Please call us at [phone] option #3 to schedule your \"Same Day\" appointment.

## 2024-11-27 ASSESSMENT — ENCOUNTER SYMPTOMS
SORE THROAT: 0
NAUSEA: 0
RHINORRHEA: 0
SINUS PRESSURE: 0
DIARRHEA: 0
EYES NEGATIVE: 1
VOMITING: 0
COUGH: 1
SINUS PAIN: 0
SHORTNESS OF BREATH: 1

## 2024-11-27 NOTE — PROGRESS NOTES
Attending Physician Statement  I have discussed the care of Chevy Galeano59 y.o.  male, including pertinent history and exam findings,  with the resident Jean Enciso MD.  History and Exam:   Chief Complaint   Patient presents with    Follow-Up from Hospital     Patient states he feeling bad       Patient seen and examined with resident team present.  Patient appears comfortable in no acute distress.  Patient does note increased cough over the last several days.  Patient per resident report against indicates no increased) phlegm production or shortness of breath at rest or with exertion.  Patient also noted to be vitally stable as well.  Patient also did have a recent hospital admission as well.  Patient is at increased risk of COVID as well as influenza.  Patient agreeable to getting influenza test done in office.  ER precautions were discussed.    Past Medical History:   Diagnosis Date    H. pylori infection     KENJI (obstructive sleep apnea) 6/14/2012     No Known Allergies I have seen and examined the patient and the key elements of the encounter have been performed by me.  BP Readings from Last 3 Encounters:   11/26/24 132/75   11/15/24 (!) 140/88   10/21/24 109/75     /75 (Site: Left Upper Arm, Position: Sitting, Cuff Size: Medium Adult)   Pulse 66   Temp 97.9 °F (36.6 °C) (Oral)   Wt 72 kg (158 lb 12.8 oz)   BMI 20.95 kg/m²   Lab Results   Component Value Date    WBC 6.5 11/15/2024    HGB 12.4 (L) 11/15/2024    HCT 39.6 (L) 11/15/2024     11/15/2024    CHOL 138 11/15/2024    TRIG 67 11/15/2024    HDL 52 11/15/2024    ALT 10 07/29/2016    AST 14 07/29/2016     11/15/2024    K 4.4 11/15/2024     11/15/2024    CREATININE 1.2 11/15/2024    BUN 17 11/15/2024    CO2 22 11/15/2024    INR 1.0 11/15/2024    LABA1C 4.6 11/15/2024     No results found for: \"LABPROT\", \"LABALBU\"  No results found for: \"IRON\", \"TIBC\", \"FERRITIN\"  No results found for: \"LDLDIRECT\"  I agree with the assessment, 
Visit Information    Have you changed or started any medications since your last visit including any over-the-counter medicines, vitamins, or herbal medicines? no   Have you stopped taking any of your medications? Is so, why? -  no  Are you having any side effects from any of your medications? - no    Have you seen any other physician or provider since your last visit?  no   Have you had any other diagnostic tests since your last visit?  no   Have you been seen in the emergency room and/or had an admission in a hospital since we last saw you?  no   Have you had your routine dental cleaning in the past 6 months?  no     Do you have an active MyChart account? If no, what is the barrier?  No:     Patient Care Team:  Jean Bourgeois MD as PCP - General (Family Medicine)  Aurelio Ruby MD as Consulting Physician (Pulmonary Disease)    Medical History Review  Past Medical, Family, and Social History reviewed and does not contribute to the patient presenting condition    Health Maintenance   Topic Date Due    HIV screen  Never done    Hepatitis C screen  Never done    DTaP/Tdap/Td vaccine (1 - Tdap) Never done    Colorectal Cancer Screen  Never done    Shingles vaccine (2 of 3) 09/28/2016    Respiratory Syncytial Virus (RSV) Pregnant or age 60 yrs+ (1 - Risk 60-74 years 1-dose series) Never done    COVID-19 Vaccine (1 - 2023-24 season) Never done    Depression Screen  08/06/2025    Lipids  11/15/2025    GFR test (Diabetes, CKD 3-4, OR last GFR 15-59)  11/15/2025    Flu vaccine  Completed    Pneumococcal 0-64 years Vaccine  Completed    Hepatitis A vaccine  Aged Out    Hepatitis B vaccine  Aged Out    Hib vaccine  Aged Out    Polio vaccine  Aged Out    Meningococcal (ACWY) vaccine  Aged Out             
Oropharynx is clear. Uvula midline. No oropharyngeal exudate or posterior oropharyngeal erythema.   Cardiovascular:      Rate and Rhythm: Normal rate.      Pulses: Normal pulses.   Pulmonary:      Effort: Pulmonary effort is normal.      Breath sounds: Wheezing (Mild) present.   Neurological:      Cranial Nerves: Cranial nerves 2-12 are intact.      Motor: Weakness present.      Comments: Mild Left Sided Weakness         Lab Results   Component Value Date    WBC 6.5 11/15/2024    HGB 12.4 (L) 11/15/2024    HCT 39.6 (L) 11/15/2024     11/15/2024    CHOL 138 11/15/2024    TRIG 67 11/15/2024    HDL 52 11/15/2024    ALT 10 07/29/2016    AST 14 07/29/2016     11/15/2024    K 4.4 11/15/2024     11/15/2024    CREATININE 1.2 11/15/2024    BUN 17 11/15/2024    CO2 22 11/15/2024    INR 1.0 11/15/2024    LABA1C 4.6 11/15/2024     Lab Results   Component Value Date    CALCIUM 9.0 11/15/2024     No results found for: \"LDLDIRECT\"    Assessment and Plan:      1. Mild COPD exacerbation in setting of Viral Infection  - Influenza A/B test inconclusive  - Starting on Prednisone 40mg for 5 days  - Mortin & Tamiflu  - albuterol sulfate HFA (VENTOLIN HFA) 108 (90 Base) MCG/ACT inhaler; Inhale 2 puffs into the lungs 4 times daily as needed for Wheezing  Dispense: 54 g; Refill: 1    3. Left-sided weakness  Persistent left sided weakness. Referral to Neuro for further evaluation.  - [facility] [place], Neurology, [facility]      Patient will follow up in 2 months. Will review neurology note and discuss care gaps.        Requested Prescriptions     Signed Prescriptions Disp Refills    albuterol sulfate HFA (VENTOLIN HFA) 108 (90 Base) MCG/ACT inhaler 54 g 1     Sig: Inhale 2 puffs into the lungs 4 times daily as needed for Wheezing    ibuprofen (ADVIL;MOTRIN) 800 MG tablet 14 tablet 0     Sig: Take 1 tablet by mouth 2 times daily as needed for Pain    predniSONE (DELTASONE) 20 MG tablet 10 tablet 0     Sig: Take

## 2024-11-29 ENCOUNTER — TELEPHONE (OUTPATIENT)
Dept: PULMONOLOGY | Age: 59
End: 2024-11-29

## 2024-11-29 DIAGNOSIS — J43.2 CENTRILOBULAR EMPHYSEMA (HCC): Primary | ICD-10-CM

## 2024-11-29 NOTE — TELEPHONE ENCOUNTER
Patient called, Trelegy isnt covered by insurance but Breo is. If you agree, please write a  script for the dose you want him to have.     Pharmacy and Allergies verified.

## 2024-11-30 RX ORDER — FLUTICASONE FUROATE AND VILANTEROL 200; 25 UG/1; UG/1
1 POWDER RESPIRATORY (INHALATION)
Qty: 1 EACH | Refills: 6 | Status: SHIPPED | OUTPATIENT
Start: 2024-11-30

## 2024-12-10 RX ORDER — CLOPIDOGREL BISULFATE 75 MG/1
75 TABLET ORAL DAILY
Qty: 21 TABLET | Refills: 0 | OUTPATIENT
Start: 2024-12-10 | End: 2024-12-31

## 2025-02-12 DIAGNOSIS — J30.89 NON-SEASONAL ALLERGIC RHINITIS, UNSPECIFIED TRIGGER: ICD-10-CM

## 2025-02-12 RX ORDER — FLUTICASONE PROPIONATE 50 MCG
2 SPRAY, SUSPENSION (ML) NASAL DAILY
Refills: 1 | OUTPATIENT
Start: 2025-02-12

## 2025-02-12 RX ORDER — FLUTICASONE PROPIONATE 50 MCG
2 SPRAY, SUSPENSION (ML) NASAL DAILY
Qty: 3 EACH | Refills: 0 | Status: SHIPPED | OUTPATIENT
Start: 2025-02-12

## 2025-02-12 NOTE — TELEPHONE ENCOUNTER
Pharmacy called regarding FLONASE medication    Original script was not written for 90day current script not enough refills    Pharmacy is requesting new script for a 90 day supply    Please send script

## 2025-02-12 NOTE — TELEPHONE ENCOUNTER
LAST VISIT: 10/21/24 with Dr Mejia  NEXT VISIT: 2/24/25 with Dr Ruby    Per last dictation patient is on this medication. Please sign for refill if ok. Thank you.

## 2025-03-17 RX ORDER — ASPIRIN 81 MG
TABLET,CHEWABLE ORAL
Qty: 90 TABLET | Refills: 1 | Status: SHIPPED | OUTPATIENT
Start: 2025-03-17 | End: 2025-03-19 | Stop reason: SDUPTHER

## 2025-03-19 ENCOUNTER — OFFICE VISIT (OUTPATIENT)
Dept: NEUROLOGY | Age: 60
End: 2025-03-19
Payer: COMMERCIAL

## 2025-03-19 VITALS
HEIGHT: 73 IN | HEART RATE: 78 BPM | WEIGHT: 167 LBS | DIASTOLIC BLOOD PRESSURE: 80 MMHG | BODY MASS INDEX: 22.13 KG/M2 | SYSTOLIC BLOOD PRESSURE: 121 MMHG

## 2025-03-19 DIAGNOSIS — R56.9 SEIZURE (HCC): ICD-10-CM

## 2025-03-19 DIAGNOSIS — R53.1 LEFT-SIDED WEAKNESS: Primary | ICD-10-CM

## 2025-03-19 DIAGNOSIS — F14.10 COCAINE ABUSE: ICD-10-CM

## 2025-03-19 PROCEDURE — 99214 OFFICE O/P EST MOD 30 MIN: CPT

## 2025-03-19 RX ORDER — ASPIRIN 81 MG/1
81 TABLET, CHEWABLE ORAL DAILY
Qty: 90 TABLET | Refills: 4 | Status: SHIPPED | OUTPATIENT
Start: 2025-03-19

## 2025-03-19 RX ORDER — FLUTICASONE FUROATE AND VILANTEROL 200; 25 UG/1; UG/1
1 POWDER RESPIRATORY (INHALATION)
Qty: 1 EACH | Refills: 3 | Status: SHIPPED | OUTPATIENT
Start: 2025-03-19

## 2025-03-19 RX ORDER — ATORVASTATIN CALCIUM 40 MG/1
40 TABLET, FILM COATED ORAL NIGHTLY
Qty: 90 TABLET | Refills: 5 | Status: SHIPPED | OUTPATIENT
Start: 2025-03-19

## 2025-03-19 ASSESSMENT — ENCOUNTER SYMPTOMS
VOICE CHANGE: 0
VOMITING: 0
ABDOMINAL PAIN: 0
COLOR CHANGE: 0
NAUSEA: 0

## 2025-03-19 NOTE — TELEPHONE ENCOUNTER
LAST VISIT: 10/21/24  NEXT VISIT: 5/5/25 (patient no showed to last visit)    Per telephone encounter from 11/29/24 patient is on this medication. Please sign for refill if ok. Thank you.

## 2025-03-19 NOTE — TELEPHONE ENCOUNTER
LAST VISIT: 10/21/24  NEXT VISIT: 5/5/25 (patient was a no show to last visit)    Per telephone encounter from 11/29/24 patient is on this medication. Please sign for refill if ok. Thank you.

## 2025-03-19 NOTE — PROGRESS NOTES
[address] [facility] [facility] [address]  [place] [address]  P: [phone]  F: [phone]    NEUROLOGY CLINIC NOTE     PATIENT NAME: Chevy Galeano  PATIENT MRN: [id number]  PRIMARY CARE PHYSICIAN: Jean Bourgeois MD    HPI:      Chevy Galeano is a 59 y.o. with past medical history of COPD, obstructive sleep apnea, prior subsegmental PE, prior tobacco use now on Chantix presents to neurology clinic for follow-up regarding TIA.    Patient was admitted to [facility] in November 2024 for symptoms of left sided hemiparesis and numbness.  Patient symptoms were noted after waking up at 5 AM on 11/15/2024.  His last known well was 11:30 PM on 11/14/2024.  His symptoms resolved after 1 hour.  Patient had extensive workup including MRI brain without contrast and EEG which showed no abnormal findings.  UDS was positive for cocaine.  Ultrasound venous Doppler bilateral lower extremity showed no evidence of DVT.  Patient was started on dual antibiotic therapy with aspirin 81 mg and Plavix 75 mg for 21 days, followed by aspirin 81 mg monotherapy.  He was also started on Lipitor 80 mg.    Today, on 3/20/2025, he presents for follow-up.  He denies any recurrent symptoms, and continues to take aspirin 81 mg and Lipitor 80 mg.  He denies tobacco or substance use.  He denies any neurological symptoms including visual disturbances, headache, focal motor or sensory deficits.    History obtained from Patient and EMR.         PATIENT HISTORY:     Past Medical History:   Diagnosis Date    COPD (chronic obstructive pulmonary disease) (HCC)     H. pylori infection     KENJI (obstructive sleep apnea) 06/14/2012        Past Surgical History:   Procedure Laterality Date    UPPER GASTROINTESTINAL ENDOSCOPY  08/23/2016        Social History     Socioeconomic History    Marital status: Single     Spouse name: Not on file    Number of children: Not on file    Years of education: Not on file

## 2025-04-23 DIAGNOSIS — R05.3 CHRONIC COUGH: Primary | ICD-10-CM

## 2025-04-23 RX ORDER — GUAIFENESIN 600 MG/1
600 TABLET, EXTENDED RELEASE ORAL 2 TIMES DAILY
Qty: 30 TABLET | Refills: 0 | Status: SHIPPED | OUTPATIENT
Start: 2025-04-23 | End: 2025-05-08

## 2025-05-05 ENCOUNTER — OFFICE VISIT (OUTPATIENT)
Dept: PULMONOLOGY | Age: 60
End: 2025-05-05

## 2025-05-05 VITALS
HEIGHT: 73 IN | DIASTOLIC BLOOD PRESSURE: 89 MMHG | WEIGHT: 162 LBS | BODY MASS INDEX: 21.47 KG/M2 | HEART RATE: 82 BPM | OXYGEN SATURATION: 100 % | SYSTOLIC BLOOD PRESSURE: 126 MMHG | RESPIRATION RATE: 19 BRPM

## 2025-05-05 DIAGNOSIS — J44.9 STAGE 2 MODERATE COPD BY GOLD CLASSIFICATION (HCC): ICD-10-CM

## 2025-05-05 DIAGNOSIS — R05.3 CHRONIC COUGH: ICD-10-CM

## 2025-05-05 DIAGNOSIS — J44.1 COPD EXACERBATION (HCC): Primary | ICD-10-CM

## 2025-05-05 DIAGNOSIS — Z87.891 STOPPED SMOKING WITH GREATER THAN 30 PACK YEAR HISTORY: ICD-10-CM

## 2025-05-05 DIAGNOSIS — J43.2 CENTRILOBULAR EMPHYSEMA (HCC): ICD-10-CM

## 2025-05-05 RX ORDER — PREDNISONE 10 MG/1
TABLET ORAL
Qty: 30 TABLET | Refills: 0 | Status: SHIPPED | OUTPATIENT
Start: 2025-05-05

## 2025-05-05 RX ORDER — ALBUTEROL SULFATE 90 UG/1
2 INHALANT RESPIRATORY (INHALATION) 4 TIMES DAILY PRN
Qty: 54 G | Refills: 1 | Status: SHIPPED | OUTPATIENT
Start: 2025-05-05

## 2025-05-05 RX ORDER — BENZONATATE 200 MG/1
200 CAPSULE ORAL 3 TIMES DAILY PRN
Qty: 30 CAPSULE | Refills: 0 | Status: SHIPPED | OUTPATIENT
Start: 2025-05-05 | End: 2025-06-04

## 2025-05-05 RX ORDER — IPRATROPIUM BROMIDE AND ALBUTEROL SULFATE 2.5; .5 MG/3ML; MG/3ML
1 SOLUTION RESPIRATORY (INHALATION) EVERY 6 HOURS
Qty: 360 ML | Refills: 6 | Status: SHIPPED | OUTPATIENT
Start: 2025-05-05

## 2025-05-05 RX ORDER — FLUTICASONE FUROATE AND VILANTEROL 200; 25 UG/1; UG/1
1 POWDER RESPIRATORY (INHALATION)
Qty: 1 EACH | Refills: 3 | Status: SHIPPED | OUTPATIENT
Start: 2025-05-05

## 2025-05-05 RX ORDER — AZITHROMYCIN 500 MG/1
500 TABLET, FILM COATED ORAL
Qty: 36 TABLET | Refills: 0 | Status: SHIPPED | OUTPATIENT
Start: 2025-05-12 | End: 2025-08-10

## 2025-05-05 RX ORDER — AZITHROMYCIN 250 MG/1
TABLET, FILM COATED ORAL
Qty: 6 TABLET | Refills: 0 | Status: SHIPPED | OUTPATIENT
Start: 2025-05-05 | End: 2025-05-15

## 2025-05-05 NOTE — PROGRESS NOTES
am    TECHNIQUE:  CTA of the chest was performed after the administration of intravenous  contrast.  Multiplanar reformatted images are provided for review.  MIP  images are provided for review. Automated exposure control, iterative  reconstruction, and/or weight based adjustment of the mA/kV was utilized to  reduce the radiation dose to as low as reasonably achievable.    COMPARISON:  None.    HISTORY:  ORDERING SYSTEM PROVIDED HISTORY: Hemoptysis, elevated D Dimer  TECHNOLOGIST PROVIDED HISTORY:  Hemoptysis, elevated D Dimer  Decision Support Exception - unselect if not a suspected or confirmed  emergency medical condition->Emergency Medical Condition (MA)    FINDINGS:  Pulmonary Arteries: There are motion induced striated/streak hypodense  artifacts involving the anterior segmental branch of the right upper lobar  branch of right pulmonary artery and corresponding venous structure.  But on  the sagittal reformatted image number 30 and 31 and coronal reformatted image  number 25 and 26 there appears to be intraluminal filling defect in the  proximal portion of the upper lobar branch of right pulmonary artery,  suspicious for pulmonary embolism.    In the rest of the pulmonary arterial system of both sides there is no other  filling defect or pulmonary embolism.    Mediastinum: Thoracic aorta is of normal size without evidence of aneurysm or  dissection.    No evidence of mass or pathologic lymphadenopathy or acute process in the  mediastinum or at pulmonary hilum in either side.    Normal cardiac size.  No evidence of pericardial effusion or pericardial  thickening.    Lungs/pleura: Minimal dependent atelectatic changes at the posterior aspects  of the lung bases bilaterally.  In the rest of both lungs there is no other  diagnostic findings.    No evidence of pleural effusion or pneumothorax.    Upper Abdomen: Limited images of the upper abdomen are unremarkable.    Soft Tissues/Bones: No acute bone or soft tissue

## 2025-05-20 DIAGNOSIS — J30.89 NON-SEASONAL ALLERGIC RHINITIS, UNSPECIFIED TRIGGER: ICD-10-CM

## 2025-05-20 RX ORDER — FLUTICASONE PROPIONATE 50 MCG
2 SPRAY, SUSPENSION (ML) NASAL DAILY
Qty: 3 EACH | Refills: 1 | Status: SHIPPED | OUTPATIENT
Start: 2025-05-20

## 2025-06-25 VITALS
OXYGEN SATURATION: 96 % | SYSTOLIC BLOOD PRESSURE: 152 MMHG | HEART RATE: 80 BPM | RESPIRATION RATE: 20 BRPM | DIASTOLIC BLOOD PRESSURE: 98 MMHG | TEMPERATURE: 98.4 F

## 2025-06-25 PROCEDURE — 99283 EMERGENCY DEPT VISIT LOW MDM: CPT | Performed by: EMERGENCY MEDICINE

## 2025-06-25 ASSESSMENT — PAIN - FUNCTIONAL ASSESSMENT: PAIN_FUNCTIONAL_ASSESSMENT: NONE - DENIES PAIN

## 2025-06-26 ENCOUNTER — APPOINTMENT (OUTPATIENT)
Dept: GENERAL RADIOLOGY | Age: 60
End: 2025-06-26
Payer: COMMERCIAL

## 2025-06-26 ENCOUNTER — HOSPITAL ENCOUNTER (EMERGENCY)
Age: 60
Discharge: HOME | End: 2025-06-26
Attending: EMERGENCY MEDICINE
Payer: COMMERCIAL

## 2025-06-26 DIAGNOSIS — R05.1 ACUTE COUGH: Primary | ICD-10-CM

## 2025-06-26 DIAGNOSIS — Z53.21 ELOPED FROM EMERGENCY DEPARTMENT: ICD-10-CM

## 2025-06-26 PROCEDURE — 6370000000 HC RX 637 (ALT 250 FOR IP)

## 2025-06-26 PROCEDURE — 71046 X-RAY EXAM CHEST 2 VIEWS: CPT

## 2025-06-26 RX ORDER — PREDNISONE 20 MG/1
50 TABLET ORAL ONCE
Status: COMPLETED | OUTPATIENT
Start: 2025-06-26 | End: 2025-06-26

## 2025-06-26 RX ADMIN — PREDNISONE 50 MG: 20 TABLET ORAL at 00:36

## 2025-06-26 NOTE — ED PROVIDER NOTES
[facility] EMERGENCY DEPARTMENT  Emergency Department Encounter  Emergency Medicine Resident     Pt Name:Chevy Galeano  MRN: [id number]  Birthdate 1965  Date of evaluation: 6/26/25  PCP:  Jean Bourgeois MD  Note Started: 12:21 AM EDT      CHIEF COMPLAINT       No chief complaint on file.      HISTORY OF PRESENT ILLNESS  (Location/Symptom, Timing/Onset, Context/Setting, Quality, Duration, Modifying Factors, Severity.)      Chevy Galeano is a 60 y.o. male with history of COPD who presents with productive cough.  States he has been having excessive mucus production.  Does follow with a pulmonologist and PCP, states he left a voicemail with his PCP to prescribe a cough syrup which was given to his sister which provided relief.  However PCP did not respond to his message.  Does not know the name of the cough syrup.  States he did try Mucinex without relief of symptoms.  States he has not been able to  one of his inhalers due to insurance and having a higher co-pay.  States he did have a recent appointment with his pulmonologist, and they are trying to figure out how to get him his inhaler.  Has been taking all of his other medications as prescribed.  Denies fever, chills, chest pain, shortness of breath, nausea, vomiting, abdominal pain, lightheadedness, dizziness.    PAST MEDICAL / SURGICAL / SOCIAL / FAMILY HISTORY      has a past medical history of Cerebral artery occlusion with cerebral infarction (HCC), COPD (chronic obstructive pulmonary disease) (HCC), H. pylori infection, and KENJI (obstructive sleep apnea).       has a past surgical history that includes Upper gastrointestinal endoscopy (08/23/2016).      Social History     Socioeconomic History    Marital status: Single     Spouse name: Not on file    Number of children: Not on file    Years of education: Not on file    Highest education level: Not on file   Occupational History    Not on file   Tobacco Use    Smoking status: Former     Current

## 2025-06-26 NOTE — ED PROVIDER NOTES
[facility]     Emergency Department     Faculty Attestation    I performed a history and physical examination of the patient and discussed management with the resident. I have reviewed and agree with the resident’s findings including all diagnostic interpretations, and treatment plans as written. Any areas of disagreement are noted on the chart. I was personally present for the key portions of any procedures. I have documented in the chart those procedures where I was not present during the key portions. I have reviewed the emergency nurses triage note. I agree with the chief complaint, past medical history, past surgical history, allergies, medications, social and family history as documented unless otherwise noted below. Documentation of the HPI, Physical Exam and Medical Decision Making performed by rona is based on my personal performance of the HPI, PE and MDM. For Physician Assistant/ Nurse Practitioner cases/documentation I have personally evaluated this patient and have completed at least one if not all key elements of the E/M (history, physical exam, and MDM). Additional findings are as noted.    Note Started: 12:13 AM EDT     61 yo M productive cough, no fever, no vomit, tolerating liquids well,   PE vss gcs 15 posterior pharynx clear, no tonsillar asymmetry, no lesion, speech clear, no tongue elevation, neck supple, no meningeal findings,  ---cxr stable, pt eloped  EKG Interpretation    Interpreted by me      CRITICAL CARE: There was a high probability of clinically significant/life threatening deterioration in this patient's condition which required my urgent intervention.  Total critical care time was 0 minutes.  This excludes any time for separately reportable procedures.       Sylvester Kline DO  06/26/25 0014       Sylvester Macdonald DO  06/26/25 0204

## 2025-06-26 NOTE — ED NOTES
Pt arrived to ED for cough and congestion  Pt states he has COPD and has had excessive mucus  Pt states he is taking his medications at home   Pt denies having any SOB or chest pain  Breathing is non labored and no acute distress is noted.   Pt resting on stretcher, call light within reach.white board updated

## 2025-06-26 NOTE — DISCHARGE INSTRUCTIONS
You were evaluated in the emergency department for a productive cough.  You were given steroids while here.  You also had a chest x-ray done which results are still pending.  [facility] or you can follow-up with your family doctor to go over the results.  Make an appointment with your family doctor or pulmonologist as soon as possible for further management of your symptoms.     Return to the emergency department if experience worsening symptoms, worsening cough, high fever, chest pain, shortness of breath, severe abdominal pain or vomiting, lightheadedness, dizziness, changes in vision, severe headache, numbness or tingling, weakness, or if you have any new concerns.

## 2025-06-26 NOTE — ED NOTES
Pt left, [facility]

## 2025-06-30 ENCOUNTER — OFFICE VISIT (OUTPATIENT)
Age: 60
End: 2025-06-30
Payer: COMMERCIAL

## 2025-06-30 VITALS
DIASTOLIC BLOOD PRESSURE: 81 MMHG | BODY MASS INDEX: 21.18 KG/M2 | HEART RATE: 64 BPM | WEIGHT: 159.8 LBS | HEIGHT: 73 IN | SYSTOLIC BLOOD PRESSURE: 129 MMHG

## 2025-06-30 DIAGNOSIS — J30.89 NON-SEASONAL ALLERGIC RHINITIS, UNSPECIFIED TRIGGER: ICD-10-CM

## 2025-06-30 DIAGNOSIS — J44.1 COPD EXACERBATION (HCC): Primary | ICD-10-CM

## 2025-06-30 DIAGNOSIS — Z11.59 NEED FOR HEPATITIS C SCREENING TEST: ICD-10-CM

## 2025-06-30 DIAGNOSIS — Z11.4 ENCOUNTER FOR SCREENING FOR HIV: ICD-10-CM

## 2025-06-30 DIAGNOSIS — Z71.85 VACCINE COUNSELING: ICD-10-CM

## 2025-06-30 DIAGNOSIS — N18.9 CHRONIC KIDNEY DISEASE, UNSPECIFIED CKD STAGE: ICD-10-CM

## 2025-06-30 PROCEDURE — 99214 OFFICE O/P EST MOD 30 MIN: CPT | Performed by: FAMILY MEDICINE

## 2025-06-30 RX ORDER — FLUTICASONE PROPIONATE 50 MCG
2 SPRAY, SUSPENSION (ML) NASAL DAILY
Qty: 3 EACH | Refills: 1 | Status: SHIPPED | OUTPATIENT
Start: 2025-06-30

## 2025-06-30 RX ORDER — CETIRIZINE HYDROCHLORIDE 10 MG/1
10 TABLET ORAL DAILY
Qty: 30 TABLET | Refills: 0 | Status: SHIPPED | OUTPATIENT
Start: 2025-06-30

## 2025-06-30 SDOH — ECONOMIC STABILITY: FOOD INSECURITY: WITHIN THE PAST 12 MONTHS, YOU WORRIED THAT YOUR FOOD WOULD RUN OUT BEFORE YOU GOT MONEY TO BUY MORE.: SOMETIMES TRUE

## 2025-06-30 SDOH — ECONOMIC STABILITY: TRANSPORTATION INSECURITY
IN THE PAST 12 MONTHS, HAS THE LACK OF TRANSPORTATION KEPT YOU FROM MEDICAL APPOINTMENTS OR FROM GETTING MEDICATIONS?: NO

## 2025-06-30 SDOH — ECONOMIC STABILITY: FOOD INSECURITY: WITHIN THE PAST 12 MONTHS, THE FOOD YOU BOUGHT JUST DIDN'T LAST AND YOU DIDN'T HAVE MONEY TO GET MORE.: SOMETIMES TRUE

## 2025-06-30 SDOH — ECONOMIC STABILITY: TRANSPORTATION INSECURITY
IN THE PAST 12 MONTHS, HAS LACK OF TRANSPORTATION KEPT YOU FROM MEETINGS, WORK, OR FROM GETTING THINGS NEEDED FOR DAILY LIVING?: YES

## 2025-06-30 SDOH — ECONOMIC STABILITY: INCOME INSECURITY: IN THE LAST 12 MONTHS, WAS THERE A TIME WHEN YOU WERE NOT ABLE TO PAY THE MORTGAGE OR RENT ON TIME?: YES

## 2025-06-30 ASSESSMENT — COPD QUESTIONNAIRES: COPD: 1

## 2025-06-30 ASSESSMENT — ENCOUNTER SYMPTOMS
HEMOPTYSIS: 0
HOARSE VOICE: 0
COUGH: 1
CHEST TIGHTNESS: 0
TROUBLE SWALLOWING: 0
WHEEZING: 0
FREQUENT THROAT CLEARING: 1
SHORTNESS OF BREATH: 0
DIFFICULTY BREATHING: 0
RHINORRHEA: 1
HEARTBURN: 0
SPUTUM PRODUCTION: 1
SORE THROAT: 1

## 2025-06-30 ASSESSMENT — PATIENT HEALTH QUESTIONNAIRE - PHQ9
SUM OF ALL RESPONSES TO PHQ QUESTIONS 1-9: 0
1. LITTLE INTEREST OR PLEASURE IN DOING THINGS: NOT AT ALL
SUM OF ALL RESPONSES TO PHQ QUESTIONS 1-9: 0
1. LITTLE INTEREST OR PLEASURE IN DOING THINGS: NOT AT ALL
SUM OF ALL RESPONSES TO PHQ QUESTIONS 1-9: 0
SUM OF ALL RESPONSES TO PHQ QUESTIONS 1-9: 0
2. FEELING DOWN, DEPRESSED OR HOPELESS: NOT AT ALL
2. FEELING DOWN, DEPRESSED OR HOPELESS: NOT AT ALL
SUM OF ALL RESPONSES TO PHQ9 QUESTIONS 1 & 2: 0

## 2025-06-30 NOTE — PATIENT INSTRUCTIONS
resources.  Phone Number: [phone]  [employer] ([facility]):  What they offer: Shelter and advocacy services for victims of domestic violence.  Phone Number: 715.920.6745 (24/7 line, select option #2).  [facility] ([facility]):  What they offer: Advocacy services for renters and homeowners.  Phone Number: [phone]  [facility]:   What they Offer: Eviction prevention resources and intake for all area emergency shelters.  Phone Number: 211 or 1-723.401.8723    [facility] Agency on Aging, District 5:    [place], [place], [place], [place], [place], [place], [place], [place], [facility]  What they offer: [place] [facility]  search on website and other resources for seniors.  Phone Number: [phone]     [facility] [place][note 2]:  What they offer: Ex-Offender [place] [facility]  Phone Number: (599) 689-2763  Website: www.[place]    [place]   First Call For Help  Phone number: [phone]    [facility] ([place], [place], [place], [place], [place], [place])  Phone number: [phone] or [phone]    [facility]  Phone number: [phone]    Subsidized Apartment Complexes in [facility]  [address] [address]  [phone]    [facility]  [address] [address]  [phone]    [facility]  [address] [address]  [phone]    [facility]  [address] [address]  [phone]    [facility]  [address] [address]  [phone]    [facility] [place]  [address] [id number] [phone]                                  [facility] Food Resources*  (Call [facility]/211 for more resources)    [employer] [facility] [employer]  What they offer: Food pantry second and fourth Tuesday 4:30-6pm  Phone Number and Address: [phone] Toll Free: [employer], between [employer]; [address] [place] [address]

## 2025-06-30 NOTE — PROGRESS NOTES
Visit Information    Have you changed or started any medications since your last visit including any over-the-counter medicines, vitamins, or herbal medicines? no   Are you having any side effects from any of your medications? -  no  Have you stopped taking any of your medications? Is so, why? -  no    Have you seen any other physician or provider since your last visit? No  Have you had any other diagnostic tests since your last visit? Yes - Records Obtained, x-ray  Have you been seen in the emergency room and/or had an admission to a hospital since we last saw you? Yes   Have you had your routine dental cleaning in the past 6 months? no    Have you activated your [employer] account? If not, what are your barriers? Yes     Patient Care Team:  Jean Bourgeois MD as PCP - General (Family Medicine)  Aurelio Ruby MD as Consulting Physician (Pulmonary Disease)    Medical History Review  Past Medical, Family, and Social History reviewed and does not contribute to the patient presenting condition    Health Maintenance   Topic Date Due    HIV screen  Never done    Hepatitis C screen  Never done    DTaP/Tdap/Td vaccine (1 - Tdap) Never done    Colorectal Cancer Screen  Never done    Shingles vaccine (2 of 3) 09/28/2016    Respiratory Syncytial Virus (RSV) Pregnant or age 60 yrs+ (1 - Risk 60-74 years 1-dose series) Never done    COVID-19 Vaccine (1 - 2024-25 season) Never done    Depression Screen  08/06/2025    Lipids  11/15/2025    GFR test (Diabetes, CKD 3-4, OR last GFR 15-59)  11/15/2025    Flu vaccine  Completed    Pneumococcal 50+ years Vaccine  Completed    Hepatitis A vaccine  Aged Out    Hepatitis B vaccine  Aged Out    Hib vaccine  Aged Out    Polio vaccine  Aged Out    Meningococcal (ACWY) vaccine  Aged Out    Meningococcal B vaccine  Aged Out    Pneumococcal 0-49 years Vaccine  Discontinued

## 2025-06-30 NOTE — PROGRESS NOTES
[facility]  [facility]  [address]  [place] [address]     Date of Visit:  2025   Patient Name: Chevy Galeano   Patient :  1965       Chevy Galeano is a 60 y.o. male who presents today for an general visit to be evaluated for the following condition(s):  Chief Complaint   Patient presents with    Cough     Patient states has been for a year and has gotten worse, states he coughs up clear mucus and sometimes green. Also states this goes on for hrs        Chevy is a 61 yo male with history of previous tobacco use (20 pack years, quit in 2024), TIA (2024, Left sided weakness and blurred vision, positive for cocaine), Possible PE History (Small segmental pulmonary embolism, see Pulmonology 2024 note, no AC recommended), COPD (Stage II with centri-lobar emphysema), KENJI, allergic rhinitis, CKD stage 2 (?), polysubstance use and H. Pylori infection p/today after being seen in the ER at [facility] on 2025 asking for cough syrup with productive cough.  He is presenting today due to worsening cough.  He had a relatively negative work up with negative chest XR and normative lung sounds per ER.  He did get a one time dose of prednisone at 50 mg and then eloped from the ER.      Regarding his COPD and cough, he presents with worsening cough for the past 2.5 weeks, with rapid progression in the last 3 days. He describes a constant cough that is mostly nonproductive, though occasionally produces green phlegm. Chevy states it feels like \"a big clunk\" in the back of his throat when coughing. He denies shortness of breath, wheezing, chest tightness, or difficulty breathing except during coughing episodes.  He reports difficulty obtaining his Symbicort and Breo Ellipta inhalers for the past 2 months due to high copay's (he reports that he was able to initially get it without any co-pay for 2 inhalers but then when he came back to get a refill 65$, which he can't afford).  He

## 2025-07-01 RX ORDER — PREDNISONE 20 MG/1
40 TABLET ORAL DAILY
Qty: 10 TABLET | Refills: 0 | Status: SHIPPED | OUTPATIENT
Start: 2025-07-01 | End: 2025-07-06

## 2025-07-01 RX ORDER — AZITHROMYCIN 250 MG/1
TABLET, FILM COATED ORAL
Qty: 6 TABLET | Refills: 0 | Status: SHIPPED | OUTPATIENT
Start: 2025-07-01 | End: 2025-07-11

## 2025-07-10 ENCOUNTER — TELEPHONE (OUTPATIENT)
Age: 60
End: 2025-07-10

## 2025-08-29 ENCOUNTER — TELEPHONE (OUTPATIENT)
Age: 60
End: 2025-08-29